# Patient Record
Sex: FEMALE | Race: BLACK OR AFRICAN AMERICAN | NOT HISPANIC OR LATINO
[De-identification: names, ages, dates, MRNs, and addresses within clinical notes are randomized per-mention and may not be internally consistent; named-entity substitution may affect disease eponyms.]

---

## 2017-10-11 ENCOUNTER — APPOINTMENT (OUTPATIENT)
Dept: CARDIOLOGY | Facility: CLINIC | Age: 67
End: 2017-10-11
Payer: MEDICARE

## 2017-10-11 PROCEDURE — 99205 OFFICE O/P NEW HI 60 MIN: CPT

## 2017-10-11 PROCEDURE — 93000 ELECTROCARDIOGRAM COMPLETE: CPT

## 2017-10-30 ENCOUNTER — APPOINTMENT (OUTPATIENT)
Dept: CARDIOLOGY | Facility: CLINIC | Age: 67
End: 2017-10-30
Payer: MEDICARE

## 2017-10-30 PROCEDURE — 93979 VASCULAR STUDY: CPT

## 2017-10-30 PROCEDURE — 93306 TTE W/DOPPLER COMPLETE: CPT

## 2017-10-30 PROCEDURE — 93880 EXTRACRANIAL BILAT STUDY: CPT

## 2017-11-01 ENCOUNTER — APPOINTMENT (OUTPATIENT)
Dept: CARDIOLOGY | Facility: CLINIC | Age: 67
End: 2017-11-01
Payer: MEDICARE

## 2017-11-01 PROCEDURE — 93015 CV STRESS TEST SUPVJ I&R: CPT

## 2017-11-01 PROCEDURE — A9502: CPT

## 2017-11-01 PROCEDURE — 78452 HT MUSCLE IMAGE SPECT MULT: CPT

## 2017-11-02 ENCOUNTER — APPOINTMENT (OUTPATIENT)
Dept: CARDIOLOGY | Facility: CLINIC | Age: 67
End: 2017-11-02
Payer: MEDICARE

## 2017-11-02 PROCEDURE — 99214 OFFICE O/P EST MOD 30 MIN: CPT

## 2017-11-17 ENCOUNTER — RECORD ABSTRACTING (OUTPATIENT)
Age: 67
End: 2017-11-17

## 2017-11-17 DIAGNOSIS — Z87.898 PERSONAL HISTORY OF OTHER SPECIFIED CONDITIONS: ICD-10-CM

## 2017-11-17 DIAGNOSIS — F17.200 NICOTINE DEPENDENCE, UNSPECIFIED, UNCOMPLICATED: ICD-10-CM

## 2017-11-17 DIAGNOSIS — Z82.5 FAMILY HISTORY OF ASTHMA AND OTHER CHRONIC LOWER RESPIRATORY DISEASES: ICD-10-CM

## 2017-11-17 DIAGNOSIS — Z82.49 FAMILY HISTORY OF ISCHEMIC HEART DISEASE AND OTHER DISEASES OF THE CIRCULATORY SYSTEM: ICD-10-CM

## 2017-11-17 DIAGNOSIS — Z78.9 OTHER SPECIFIED HEALTH STATUS: ICD-10-CM

## 2017-11-17 RX ORDER — ASPIRIN ENTERIC COATED TABLETS 81 MG 81 MG/1
81 TABLET, DELAYED RELEASE ORAL
Refills: 0 | Status: ACTIVE | COMMUNITY

## 2017-11-22 ENCOUNTER — APPOINTMENT (OUTPATIENT)
Dept: CARDIOLOGY | Facility: CLINIC | Age: 67
End: 2017-11-22
Payer: MEDICARE

## 2017-11-22 VITALS
HEART RATE: 86 BPM | HEIGHT: 67 IN | BODY MASS INDEX: 29.98 KG/M2 | RESPIRATION RATE: 16 BRPM | WEIGHT: 191 LBS | OXYGEN SATURATION: 98 % | DIASTOLIC BLOOD PRESSURE: 68 MMHG | SYSTOLIC BLOOD PRESSURE: 128 MMHG

## 2017-11-22 PROCEDURE — 99214 OFFICE O/P EST MOD 30 MIN: CPT

## 2017-11-22 RX ORDER — CLOPIDOGREL 75 MG/1
75 TABLET, FILM COATED ORAL DAILY
Refills: 0 | Status: DISCONTINUED | COMMUNITY
End: 2017-11-22

## 2018-05-23 ENCOUNTER — APPOINTMENT (OUTPATIENT)
Dept: CARDIOLOGY | Facility: CLINIC | Age: 68
End: 2018-05-23
Payer: MEDICARE

## 2018-06-28 ENCOUNTER — APPOINTMENT (OUTPATIENT)
Dept: CARDIOLOGY | Facility: CLINIC | Age: 68
End: 2018-06-28
Payer: MEDICARE

## 2018-06-28 VITALS
DIASTOLIC BLOOD PRESSURE: 70 MMHG | WEIGHT: 200 LBS | BODY MASS INDEX: 31.39 KG/M2 | HEART RATE: 75 BPM | HEIGHT: 67 IN | SYSTOLIC BLOOD PRESSURE: 142 MMHG

## 2018-06-28 PROCEDURE — 99214 OFFICE O/P EST MOD 30 MIN: CPT

## 2019-01-16 ENCOUNTER — APPOINTMENT (OUTPATIENT)
Dept: CARDIOLOGY | Facility: CLINIC | Age: 69
End: 2019-01-16
Payer: MEDICARE

## 2019-01-16 VITALS
SYSTOLIC BLOOD PRESSURE: 128 MMHG | OXYGEN SATURATION: 97 % | HEART RATE: 75 BPM | WEIGHT: 200 LBS | DIASTOLIC BLOOD PRESSURE: 62 MMHG | HEIGHT: 67 IN | BODY MASS INDEX: 31.39 KG/M2

## 2019-01-16 PROCEDURE — 99214 OFFICE O/P EST MOD 30 MIN: CPT

## 2019-01-16 NOTE — REASON FOR VISIT
[Follow-Up - Clinic] : a clinic follow-up of [FreeTextEntry2] : nonobstructive cath, abnormal MPI stress test, HTN, dyslipidemia [FreeTextEntry1] : Charlotte is a 68-year-old female with history of hypertension, dyslipidemia, moderate obesity, ocular stroke, sciatica, knee pain, limited ambulation, arthritis, depression, death of her son in 2016 and her  June 2018 with lung cancer. \par \par Cardiac catheterization November 2017, LVEF 65%, proximal LAD 70% with FFR 0.83 not significant small caliber first diagonal 80%.  Medical therapy.  Consider PCI of LAD if symptoms of angina. \par \par Cardiovascular review of systems is negative for exertional chest pain, dyspnea, palpitations, or syncope.  No PND, orthopnea, or leg edema.  No bleeding or black stool.  The patient has dizziness consistent with vertigo. \par \par Patient is not exercising on a routine basis.  The patient has back pain and knee pain which limits her ambulation.  Patient walking 10 minutes without exertional chest pain. \par \par 2-D echo October 2017 LVEF 60-65%, mild diastolic dysfunction, mild MR, normal PASP.\par  \par Carotid and abdominal ultrasound October 2017, 2.6cm\par \par Lexascan Myoview stress test, November 2017, LVEF 57%, mild midanterior ischemic defect, nonischemic EKG response, no anginal symptoms, baseline sinus rhythm NSST\par \par EKG, October 11, 2017, sinus rhythm with inferolateral ST-T wave abnormalities consistent with ischemia. \par \par

## 2019-01-16 NOTE — ASSESSMENT
[FreeTextEntry1] : Charlotte is a 68-year-old female  with medical history detailed above and active medical issues including:\par \par - No anginal symptoms, limited exercise capacity abnormal stress test November 2017,  Cardiac catheterization November 2017, LVEF 65%, proximal LAD 70% with FFR 0.83 not significant small caliber first diagonal 80%.  Interventional cardiologist Dr Hu. Medical therapy.  Consider PCI of LAD if symptoms of angina. \par \par -Hypertension.  Currently at goal less than 130/80, on hydrochlorothiazide. \par \par -Dyslipidemia.  On Crestor, well tolerated. \par \par -History of ocular stroke with left eye blindness in 2014. \par \par -Anxiety and depression after son passed away in 2016. \par \par -Chronic back pain, sciatica, and knee pain limited ambulation. \par \par -Tobacco addiction, smoking cessation discussed at length.  Patient states she quit smoking 8 days ago.\par \par  Patient will be seen seen in cardiology followup in 6 months. Patient will have 2-D echocardiogram to assess for  LV systolic function, wall motion and  structural heart disease. Carotid and abdominal ultrasound to assess for obstructive PAD. \par \par Current cardiac medications remain unchanged. Repeat labs will be ordered with PMD.\par \par Charlotte will follow up with Dr. Kael Garcia for primary care. \par \par

## 2019-04-22 ENCOUNTER — APPOINTMENT (OUTPATIENT)
Dept: CARDIOLOGY | Facility: CLINIC | Age: 69
End: 2019-04-22
Payer: MEDICARE

## 2019-04-22 PROCEDURE — 93306 TTE W/DOPPLER COMPLETE: CPT

## 2019-04-22 PROCEDURE — 93979 VASCULAR STUDY: CPT

## 2019-04-29 ENCOUNTER — APPOINTMENT (OUTPATIENT)
Dept: CARDIOLOGY | Facility: CLINIC | Age: 69
End: 2019-04-29
Payer: MEDICARE

## 2019-04-29 PROCEDURE — 93880 EXTRACRANIAL BILAT STUDY: CPT

## 2019-05-01 ENCOUNTER — APPOINTMENT (OUTPATIENT)
Dept: CARDIOLOGY | Facility: CLINIC | Age: 69
End: 2019-05-01
Payer: MEDICARE

## 2019-05-01 VITALS
WEIGHT: 196 LBS | SYSTOLIC BLOOD PRESSURE: 130 MMHG | HEART RATE: 77 BPM | HEIGHT: 67 IN | DIASTOLIC BLOOD PRESSURE: 76 MMHG | OXYGEN SATURATION: 95 % | BODY MASS INDEX: 30.76 KG/M2

## 2019-05-01 PROCEDURE — 99214 OFFICE O/P EST MOD 30 MIN: CPT

## 2019-05-01 RX ORDER — UBIDECARENONE/VIT E ACET 100MG-5
25 MCG CAPSULE ORAL
Refills: 0 | Status: DISCONTINUED | COMMUNITY
End: 2019-05-01

## 2019-05-01 NOTE — ASSESSMENT
[FreeTextEntry1] : Charlotte is a 69-year-old female  with medical history detailed above and active medical issues including:\par \par - No anginal symptoms, limited exercise capacity abnormal stress test November 2017,  Cardiac catheterization November 2017, LVEF 65%, proximal LAD 70% with FFR 0.83 not significant small caliber first diagonal 80%.  Interventional cardiologist Dr Hu. Medical therapy.  Consider PCI of LAD if symptoms of angina. Normal echo LVEF 60% April 2019.\par \par -Hypertension.  Currently at goal less than 130/80, on hydrochlorothiazide. \par \par -Dyslipidemia.  On Crestor, well tolerated. \par \par -History of ocular stroke with left eye blindness in 2014. \par \par -Anxiety and depression after son passed away in 2016. \par \par -Chronic back pain, sciatica, and knee pain limited ambulation. \par \par -Tobacco addiction, smoking cessation discussed at length.  Patient states she quit smoking 8 days ago.\par \par Patient will be seen seen in cardiology followup in 6 months. Current cardiac medications remain unchanged. Repeat labs will be ordered with PMD.\par \par Charlotte will follow up with Dr. Kael Garcia for primary care. \par \par

## 2019-05-01 NOTE — PHYSICAL EXAM
[Normal Appearance] : normal appearance [Well Groomed] : well groomed [General Appearance - Well Developed] : well developed [General Appearance - In No Acute Distress] : no acute distress [No Deformities] : no deformities [General Appearance - Well Nourished] : well nourished [Eyelids - No Xanthelasma] : the eyelids demonstrated no xanthelasmas [Normal Conjunctiva] : the conjunctiva exhibited no abnormalities [Normal Oral Mucosa] : normal oral mucosa [No Oral Pallor] : no oral pallor [No Oral Cyanosis] : no oral cyanosis [Normal Jugular Venous A Waves Present] : normal jugular venous A waves present [Normal Jugular Venous V Waves Present] : normal jugular venous V waves present [No Jugular Venous Abdul A Waves] : no jugular venous abdul A waves [Exaggerated Use Of Accessory Muscles For Inspiration] : no accessory muscle use [Respiration, Rhythm And Depth] : normal respiratory rhythm and effort [Auscultation Breath Sounds / Voice Sounds] : lungs were clear to auscultation bilaterally [Heart Sounds] : normal S1 and S2 [Heart Rate And Rhythm] : heart rate and rhythm were normal [Abdomen Soft] : soft [Abdomen Tenderness] : non-tender [Murmurs] : no murmurs present [Abnormal Walk] : normal gait [Abdomen Mass (___ Cm)] : no abdominal mass palpated [Gait - Sufficient For Exercise Testing] : the gait was sufficient for exercise testing [Nail Clubbing] : no clubbing of the fingernails [Petechial Hemorrhages (___cm)] : no petechial hemorrhages [Cyanosis, Localized] : no localized cyanosis [No Venous Stasis] : no venous stasis [Skin Color & Pigmentation] : normal skin color and pigmentation [] : no rash [No Skin Ulcers] : no skin ulcer [Skin Lesions] : no skin lesions [Oriented To Time, Place, And Person] : oriented to person, place, and time [No Xanthoma] : no  xanthoma was observed [Affect] : the affect was normal [Mood] : the mood was normal [No Anxiety] : not feeling anxious

## 2019-05-01 NOTE — REASON FOR VISIT
[Follow-Up - Clinic] : a clinic follow-up of [FreeTextEntry2] : nonobstructive cath Nov 2017, abnormal MPI stress test, HTN, dyslipidemia [FreeTextEntry1] : Charlotte is a 69-year-old female with history of hypertension, dyslipidemia, moderate obesity, ocular stroke, sciatica, knee pain, limited ambulation, arthritis, depression, death of her son in 2016 and her  June 2018 with lung cancer. \par \par Cardiac catheterization November 2017, LVEF 65%, proximal LAD 70% with FFR 0.83 not significant small caliber first diagonal 80%.  Medical therapy.  Consider PCI of LAD if symptoms of angina. \par \par Cardiovascular review of systems is negative for exertional chest pain, dyspnea, palpitations, or syncope.  No PND, orthopnea, or leg edema.  No bleeding or black stool.  The patient has dizziness consistent with vertigo. \par \par Patient is not exercising on a routine basis.  The patient has back pain and knee pain which limits her ambulation.  Patient walking 10 minutes without exertional chest pain. \par \par Echocardiogram April 2019, LVEF 60%, mild diastolic dysfunction, trace MR TR, normal RVSP\par \par Carotid and abdominal ultrasound April 2019, mild nonobstructive plaque, normal abdominal aortic size\par \par 2-D echo October 2017 LVEF 60-65%, mild diastolic dysfunction, mild MR, normal PASP.\par  \par Carotid and abdominal ultrasound October 2017, 2.6cm\par \par Lexascan Myoview stress test, November 2017, LVEF 57%, mild midanterior ischemic defect, nonischemic EKG response, no anginal symptoms, baseline sinus rhythm NSST\par \par EKG, October 11, 2017, sinus rhythm with inferolateral ST-T wave abnormalities consistent with ischemia. \par \par

## 2019-11-07 ENCOUNTER — APPOINTMENT (OUTPATIENT)
Dept: CARDIOLOGY | Facility: CLINIC | Age: 69
End: 2019-11-07
Payer: MEDICARE

## 2019-11-07 VITALS
HEIGHT: 66.5 IN | WEIGHT: 192 LBS | OXYGEN SATURATION: 94 % | BODY MASS INDEX: 30.49 KG/M2 | SYSTOLIC BLOOD PRESSURE: 132 MMHG | DIASTOLIC BLOOD PRESSURE: 78 MMHG | HEART RATE: 83 BPM

## 2019-11-07 PROCEDURE — 99214 OFFICE O/P EST MOD 30 MIN: CPT

## 2019-11-07 NOTE — PHYSICAL EXAM
[General Appearance - Well Developed] : well developed [Normal Appearance] : normal appearance [Well Groomed] : well groomed [General Appearance - Well Nourished] : well nourished [No Deformities] : no deformities [General Appearance - In No Acute Distress] : no acute distress [Normal Conjunctiva] : the conjunctiva exhibited no abnormalities [Eyelids - No Xanthelasma] : the eyelids demonstrated no xanthelasmas [Normal Oral Mucosa] : normal oral mucosa [No Oral Pallor] : no oral pallor [No Oral Cyanosis] : no oral cyanosis [Normal Jugular Venous A Waves Present] : normal jugular venous A waves present [Normal Jugular Venous V Waves Present] : normal jugular venous V waves present [No Jugular Venous Abdul A Waves] : no jugular venous abdul A waves [Respiration, Rhythm And Depth] : normal respiratory rhythm and effort [Exaggerated Use Of Accessory Muscles For Inspiration] : no accessory muscle use [Auscultation Breath Sounds / Voice Sounds] : lungs were clear to auscultation bilaterally [Heart Rate And Rhythm] : heart rate and rhythm were normal [Heart Sounds] : normal S1 and S2 [Murmurs] : no murmurs present [Abdomen Soft] : soft [Abdomen Tenderness] : non-tender [Abdomen Mass (___ Cm)] : no abdominal mass palpated [Abnormal Walk] : normal gait [Gait - Sufficient For Exercise Testing] : the gait was sufficient for exercise testing [Cyanosis, Localized] : no localized cyanosis [Nail Clubbing] : no clubbing of the fingernails [Petechial Hemorrhages (___cm)] : no petechial hemorrhages [Skin Color & Pigmentation] : normal skin color and pigmentation [] : no rash [Skin Lesions] : no skin lesions [No Venous Stasis] : no venous stasis [No Skin Ulcers] : no skin ulcer [No Xanthoma] : no  xanthoma was observed [Affect] : the affect was normal [Oriented To Time, Place, And Person] : oriented to person, place, and time [Mood] : the mood was normal [No Anxiety] : not feeling anxious

## 2019-11-07 NOTE — ASSESSMENT
[FreeTextEntry1] : Charlotte is a 69-year-old female  with medical history detailed above and active medical issues including:\par \par - Patient has dyspnea with moderate exertion with limited exercise capacity abnormal stress test November 2017,  Cardiac catheterization November 2017, LVEF 65%, proximal LAD 70% with FFR 0.83 not significant small caliber first diagonal 80%.  Interventional cardiologist Dr Hu. Medical therapy.  Consider PCI of LAD if symptoms of angina. Normal echo LVEF 60% April 2019. Patient will have noninvasive testing with a Lexascan Myoview stress test to assess for obstructive CAD. \par \par -Hypertension.  Currently at goal less than 130/80, on hydrochlorothiazide. \par \par -Dyslipidemia.  On Crestor, well tolerated. \par \par -History of ocular stroke with left eye blindness in 2014. \par \par -Anxiety and depression after son passed away in 2016. \par \par -Chronic back pain, sciatica, and knee pain limited ambulation. \par \par -Tobacco addiction, smoking cessation discussed at length.  Patient states she quit smoking 8 days ago.\par \par Patient will be seen in cardiology follow-up after noninvasive testing. Current cardiac medications remain unchanged. Repeat labs will be ordered with PMD.\par \par Charlotte will follow up with Dr. Kael Garcia for primary care. \par \par

## 2019-11-07 NOTE — REASON FOR VISIT
[Follow-Up - Clinic] : a clinic follow-up of [FreeTextEntry2] : moderate CAD cath Nov 2017 LAD 70% with nonobstructive FFR, abnormal MPI stress test, HTN, dyslipidemia [FreeTextEntry1] : Charlotte is a 69-year-old female with history of hypertension, dyslipidemia, moderate obesity, ocular stroke, sciatica, knee pain, limited ambulation, arthritis, depression, death of her son in 2016 and her  June 2018 with lung cancer. \par \par Cardiac catheterization November 2017, LVEF 65%, proximal LAD 70% with FFR 0.83 not significant small caliber first diagonal 80%.  Medical therapy.  Consider PCI of LAD if symptoms of angina. \par \par Patient has dyspnea with moderate exertion. Cardiovascular review of systems is negative for exertional chest pain, palpitations, or syncope.  No PND, orthopnea, or leg edema.  No bleeding or black stool.  The patient has dizziness consistent with vertigo. \par \par Patient is not exercising on a routine basis.  The patient has back pain and knee pain which limits her ambulation.  Patient walking less than 5 minutes and is not able to complete treadmill stress test. Patient will be scheduled for pharmacologic stress test.\par \par Echocardiogram April 2019, LVEF 60%, mild diastolic dysfunction, trace MR TR, normal RVSP\par \par Carotid and abdominal ultrasound April 2019, mild nonobstructive plaque, normal abdominal aortic size\par \par 2-D echo October 2017 LVEF 60-65%, mild diastolic dysfunction, mild MR, normal PASP.\par  \par Carotid and abdominal ultrasound October 2017, 2.6cm\par \par Lexascan Myoview stress test, November 2017, LVEF 57%, mild midanterior ischemic defect, nonischemic EKG response, no anginal symptoms, baseline sinus rhythm NSST\par \par EKG, October 11, 2017, sinus rhythm with inferolateral ST-T wave abnormalities consistent with ischemia. \par \par

## 2019-11-19 ENCOUNTER — APPOINTMENT (OUTPATIENT)
Dept: CARDIOLOGY | Facility: CLINIC | Age: 69
End: 2019-11-19
Payer: MEDICARE

## 2019-11-19 PROCEDURE — A9502: CPT

## 2019-11-19 PROCEDURE — 78452 HT MUSCLE IMAGE SPECT MULT: CPT

## 2019-11-19 PROCEDURE — 93015 CV STRESS TEST SUPVJ I&R: CPT

## 2019-12-12 ENCOUNTER — APPOINTMENT (OUTPATIENT)
Dept: CARDIOLOGY | Facility: CLINIC | Age: 69
End: 2019-12-12
Payer: MEDICARE

## 2019-12-12 VITALS
OXYGEN SATURATION: 93 % | DIASTOLIC BLOOD PRESSURE: 78 MMHG | HEIGHT: 66 IN | BODY MASS INDEX: 30.86 KG/M2 | HEART RATE: 80 BPM | SYSTOLIC BLOOD PRESSURE: 132 MMHG | WEIGHT: 192 LBS

## 2019-12-12 PROCEDURE — 99214 OFFICE O/P EST MOD 30 MIN: CPT

## 2019-12-12 RX ORDER — ZINC SU/MERCURIC OX/BORIC ACID
1 OINTMENT (GRAM) OPHTHALMIC (EYE)
Refills: 0 | Status: DISCONTINUED | COMMUNITY
End: 2019-12-12

## 2019-12-12 NOTE — REASON FOR VISIT
[Follow-Up - Clinic] : a clinic follow-up of [FreeTextEntry2] : MILLER and fatigue, cath Nov 2107 prox LAD 70% with FFR 0.83, abnormal MPI stress test moderate anterior ischemia , HTN, dyslipidemia [FreeTextEntry1] : Charlotte is a 69-year-old female with history of hypertension, dyslipidemia, moderate obesity, ocular stroke, sciatica, knee pain, limited ambulation, arthritis, depression, death of her son in 2016 and her  June 2018 with lung cancer. \par \par Cardiac catheterization November 2017, LVEF 65%, proximal LAD 70% with FFR 0.83 ( significant stenosis is FFR <0.8),  small caliber first diagonal 80% with recommendation for medical therapy.  Consider PCI of LAD if symptoms of angina as per interventionalist Dr Bench RIVAS. \par \par Patient has dyspnea with moderate exertion and fatigue. Cardiovascular review of systems is negative for exertional chest pain, palpitations, or syncope.  No PND, orthopnea, or leg edema.  No bleeding or black stool.  The patient has dizziness consistent with vertigo. \par \par Patient is not exercising on a routine basis.  The patient has back pain and knee pain which limits her ambulation.  Patient walking less than 5 minutes. \par \par Lexascan Myoview stress test November 2019 LVEF 57%, moderate anterior ischemia seen on images directly reviewed by me, nondiagnostic EKG response with exaggeration of the baseline changes, no chest pain, baseline sinus rhythm NSST\par \par Echocardiogram April 2019, LVEF 60%, mild diastolic dysfunction, trace MR TR, normal RVSP\par \par Carotid and abdominal ultrasound April 2019, mild nonobstructive plaque, normal abdominal aortic size\par \par 2-D echo October 2017 LVEF 60-65%, mild diastolic dysfunction, mild MR, normal PASP.\par  \par Carotid and abdominal ultrasound October 2017, 2.6cm\par \par Lexascan Myoview stress test, November 2017, LVEF 57%, mild midanterior ischemic defect, nonischemic EKG response, no anginal symptoms, baseline sinus rhythm NSST\par \par EKG, October 11, 2017, sinus rhythm with inferolateral ST-T wave abnormalities consistent with ischemia. \par \par

## 2019-12-12 NOTE — PHYSICAL EXAM
[General Appearance - Well Developed] : well developed [Well Groomed] : well groomed [General Appearance - Well Nourished] : well nourished [Normal Appearance] : normal appearance [No Deformities] : no deformities [Normal Conjunctiva] : the conjunctiva exhibited no abnormalities [General Appearance - In No Acute Distress] : no acute distress [Eyelids - No Xanthelasma] : the eyelids demonstrated no xanthelasmas [Normal Oral Mucosa] : normal oral mucosa [No Oral Pallor] : no oral pallor [No Oral Cyanosis] : no oral cyanosis [Normal Jugular Venous A Waves Present] : normal jugular venous A waves present [Normal Jugular Venous V Waves Present] : normal jugular venous V waves present [No Jugular Venous Abdul A Waves] : no jugular venous abdul A waves [Respiration, Rhythm And Depth] : normal respiratory rhythm and effort [Exaggerated Use Of Accessory Muscles For Inspiration] : no accessory muscle use [Auscultation Breath Sounds / Voice Sounds] : lungs were clear to auscultation bilaterally [Heart Rate And Rhythm] : heart rate and rhythm were normal [Murmurs] : no murmurs present [Heart Sounds] : normal S1 and S2 [Abdomen Soft] : soft [Abdomen Tenderness] : non-tender [Abdomen Mass (___ Cm)] : no abdominal mass palpated [Abnormal Walk] : normal gait [Nail Clubbing] : no clubbing of the fingernails [Gait - Sufficient For Exercise Testing] : the gait was sufficient for exercise testing [Cyanosis, Localized] : no localized cyanosis [Petechial Hemorrhages (___cm)] : no petechial hemorrhages [Skin Color & Pigmentation] : normal skin color and pigmentation [No Venous Stasis] : no venous stasis [Skin Lesions] : no skin lesions [] : no rash [No Skin Ulcers] : no skin ulcer [No Xanthoma] : no  xanthoma was observed [Affect] : the affect was normal [Oriented To Time, Place, And Person] : oriented to person, place, and time [No Anxiety] : not feeling anxious [Mood] : the mood was normal

## 2019-12-12 NOTE — ASSESSMENT
[FreeTextEntry1] : Charlotte is a 69-year-old female  with medical history detailed above and active medical issues including:\par \par - Dyspnea on exertion, fatigue, anginal equivalent, moderate anterior ischemia Myoview stress test November 2019, proximal LAD 70% stenosis with FFR 0.83 (significant stenosis is FFR<0.8). Risks and options of cardiac catheterization have been discussed, including the risk of bleeding stroke heart attack.  Patient wishes to proceed and will be scheduled for catheterization within one week.  Aspirin and Plavix will be continued until catheterization.  Persistent chest pain patient will call 911 and go to the emergency room. \par \par -Hypertension.  Currently at goal less than 130/80, on hydrochlorothiazide. \par \par -Dyslipidemia.  On Crestor, well tolerated. \par \par -History of ocular stroke with left eye blindness in 2014. \par \par -Anxiety and depression after son passed away in 2016. \par \par -Chronic back pain, sciatica, and knee pain limited ambulation. \par \par -Tobacco addiction, smoking cessation discussed at length.  Patient states she quit smoking 8 days ago.\par \par Patient will be seen in cardiology follow-up after cardiac catherization. Plavix added to medial regimen. Repeat labs will be ordered.\par \par Charlotte will follow up with Dr. Kael Garcia for primary care. \par \par

## 2019-12-19 ENCOUNTER — OUTPATIENT (OUTPATIENT)
Dept: OUTPATIENT SERVICES | Facility: HOSPITAL | Age: 69
LOS: 1 days | End: 2019-12-19
Payer: MEDICARE

## 2019-12-19 PROCEDURE — 92978 ENDOLUMINL IVUS OCT C 1ST: CPT | Mod: 26,LD

## 2019-12-19 PROCEDURE — 92928 PRQ TCAT PLMT NTRAC ST 1 LES: CPT | Mod: LD

## 2019-12-19 PROCEDURE — 93458 L HRT ARTERY/VENTRICLE ANGIO: CPT | Mod: 26,XU

## 2019-12-19 PROCEDURE — 93571 IV DOP VEL&/PRESS C FLO 1ST: CPT | Mod: 26,52

## 2019-12-23 ENCOUNTER — APPOINTMENT (OUTPATIENT)
Dept: CARDIOLOGY | Facility: CLINIC | Age: 69
End: 2019-12-23
Payer: MEDICARE

## 2019-12-23 ENCOUNTER — NON-APPOINTMENT (OUTPATIENT)
Age: 69
End: 2019-12-23

## 2019-12-23 VITALS
DIASTOLIC BLOOD PRESSURE: 72 MMHG | WEIGHT: 192 LBS | OXYGEN SATURATION: 98 % | BODY MASS INDEX: 30.86 KG/M2 | HEART RATE: 67 BPM | HEIGHT: 66 IN | SYSTOLIC BLOOD PRESSURE: 138 MMHG

## 2019-12-23 PROCEDURE — 93000 ELECTROCARDIOGRAM COMPLETE: CPT

## 2019-12-23 PROCEDURE — 99214 OFFICE O/P EST MOD 30 MIN: CPT

## 2020-01-29 ENCOUNTER — APPOINTMENT (OUTPATIENT)
Dept: CARDIOLOGY | Facility: CLINIC | Age: 70
End: 2020-01-29
Payer: MEDICARE

## 2020-01-29 ENCOUNTER — NON-APPOINTMENT (OUTPATIENT)
Age: 70
End: 2020-01-29

## 2020-01-29 VITALS
SYSTOLIC BLOOD PRESSURE: 116 MMHG | HEART RATE: 70 BPM | DIASTOLIC BLOOD PRESSURE: 60 MMHG | OXYGEN SATURATION: 95 % | BODY MASS INDEX: 31.08 KG/M2 | WEIGHT: 198 LBS | HEIGHT: 67 IN

## 2020-01-29 PROCEDURE — 93000 ELECTROCARDIOGRAM COMPLETE: CPT

## 2020-01-29 PROCEDURE — 99214 OFFICE O/P EST MOD 30 MIN: CPT

## 2020-01-29 NOTE — PHYSICAL EXAM
[Well Groomed] : well groomed [Normal Appearance] : normal appearance [General Appearance - Well Developed] : well developed [General Appearance - Well Nourished] : well nourished [No Deformities] : no deformities [General Appearance - In No Acute Distress] : no acute distress [Normal Conjunctiva] : the conjunctiva exhibited no abnormalities [Eyelids - No Xanthelasma] : the eyelids demonstrated no xanthelasmas [Normal Oral Mucosa] : normal oral mucosa [No Oral Pallor] : no oral pallor [No Oral Cyanosis] : no oral cyanosis [Normal Jugular Venous A Waves Present] : normal jugular venous A waves present [Normal Jugular Venous V Waves Present] : normal jugular venous V waves present [No Jugular Venous Abdul A Waves] : no jugular venous abdul A waves [Respiration, Rhythm And Depth] : normal respiratory rhythm and effort [Exaggerated Use Of Accessory Muscles For Inspiration] : no accessory muscle use [Auscultation Breath Sounds / Voice Sounds] : lungs were clear to auscultation bilaterally [Heart Rate And Rhythm] : heart rate and rhythm were normal [Murmurs] : no murmurs present [Heart Sounds] : normal S1 and S2 [Abdomen Soft] : soft [Abdomen Tenderness] : non-tender [Abnormal Walk] : normal gait [Abdomen Mass (___ Cm)] : no abdominal mass palpated [Gait - Sufficient For Exercise Testing] : the gait was sufficient for exercise testing [Nail Clubbing] : no clubbing of the fingernails [Cyanosis, Localized] : no localized cyanosis [Petechial Hemorrhages (___cm)] : no petechial hemorrhages [] : no rash [Skin Color & Pigmentation] : normal skin color and pigmentation [No Venous Stasis] : no venous stasis [Skin Lesions] : no skin lesions [No Skin Ulcers] : no skin ulcer [No Xanthoma] : no  xanthoma was observed [Oriented To Time, Place, And Person] : oriented to person, place, and time [Mood] : the mood was normal [Affect] : the affect was normal [No Anxiety] : not feeling anxious

## 2020-01-29 NOTE — REASON FOR VISIT
[Follow-Up - Clinic] : a clinic follow-up of [FreeTextEntry2] : CAROL pLAD 12/19/19, abnormal MPI stress test moderate anterior ischemia , HTN, dyslipidemia [FreeTextEntry1] : Charlotte is a 70-year-old female with history of hypertension, dyslipidemia, moderate obesity, ocular stroke, sciatica, knee pain, limited ambulation, arthritis, depression, death of her son in 2016 and her  June 2018 with lung cancer. \par \par Cardiac catheterization 12/19/19, LVEF 57%, CAROL proximal LAD D1 80% stenosis D2 90% stenosis\par \par Cardiac catheterization November 2017, LVEF 65%, proximal LAD 70% with FFR 0.83 ( significant stenosis is FFR <0.8),  small caliber first diagonal 80% with recommendation for medical therapy.  Consider PCI of LAD if symptoms of angina as per interventionalist Dr Bench RIVAS. \par \par Patient has dyspnea with moderate exertion and fatigue. Cardiovascular review of systems is negative for exertional chest pain, palpitations, or syncope.  No PND, orthopnea, or leg edema.  No bleeding or black stool.  The patient has dizziness consistent with vertigo. \par \par Patient is not exercising on a routine basis.  The patient has back pain and knee pain which limits her ambulation.  Patient walking less than 5 minutes. \par \par Lexascan Myoview stress test November 2019 LVEF 57%, moderate anterior ischemia seen on images directly reviewed by me, nondiagnostic EKG response with exaggeration of the baseline changes, no chest pain, baseline sinus rhythm NSST\par \par Echocardiogram April 2019, LVEF 60%, mild diastolic dysfunction, trace MR TR, normal RVSP\par \par Carotid and abdominal ultrasound April 2019, mild nonobstructive plaque, normal abdominal aortic size\par \par 2-D echo October 2017 LVEF 60-65%, mild diastolic dysfunction, mild MR, normal PASP.\par  \par Carotid and abdominal ultrasound October 2017, 2.6cm\par \par Lexascan Myoview stress test, November 2017, LVEF 57%, mild midanterior ischemic defect, nonischemic EKG response, no anginal symptoms, baseline sinus rhythm NSST\par \par EKG, October 11, 2017, sinus rhythm with inferolateral ST-T wave abnormalities consistent with ischemia. \par \par

## 2020-01-29 NOTE — ASSESSMENT
[FreeTextEntry1] : Charlotte is a 69-year-old female  with medical history detailed above and active medical issues including:\par \par - CAD, CAROL proximal LAD 12/19/19, stable on aspirin Plavix Toprol, Crestor\par \par - Hypertension.  Currently at goal less than 130/80, on hydrochlorothiazide. \par \par - Dyslipidemia on Crestor well tolerated. \par \par - History of ocular stroke with left eye blindness in 2014. \par \par - Anxiety and depression after son passed away in 2016. \par \par -Chronic back pain, sciatica, and knee pain limited ambulation. \par \par - Tobacco addiction, smoking cessation discussed at length.  Patient states she quit smoking 12/19/19\par \par Patient will be seen in cardiology follow-up 3 months.  Repeat labs will be ordered.\par \par Charlotte will follow up with Dr. Kael Garcia for primary care. \par \par

## 2020-03-10 ENCOUNTER — APPOINTMENT (OUTPATIENT)
Dept: CARDIOLOGY | Facility: CLINIC | Age: 70
End: 2020-03-10
Payer: MEDICARE

## 2020-03-10 VITALS
BODY MASS INDEX: 31.39 KG/M2 | DIASTOLIC BLOOD PRESSURE: 68 MMHG | OXYGEN SATURATION: 95 % | HEIGHT: 67 IN | HEART RATE: 75 BPM | SYSTOLIC BLOOD PRESSURE: 138 MMHG | WEIGHT: 200 LBS

## 2020-03-10 PROCEDURE — 99214 OFFICE O/P EST MOD 30 MIN: CPT

## 2020-03-10 RX ORDER — HYDROCHLOROTHIAZIDE 25 MG/1
25 TABLET ORAL DAILY
Refills: 0 | Status: DISCONTINUED | COMMUNITY
End: 2020-03-10

## 2020-03-10 NOTE — PHYSICAL EXAM
[General Appearance - Well Developed] : well developed [Normal Appearance] : normal appearance [Well Groomed] : well groomed [General Appearance - Well Nourished] : well nourished [No Deformities] : no deformities [General Appearance - In No Acute Distress] : no acute distress [Normal Conjunctiva] : the conjunctiva exhibited no abnormalities [Eyelids - No Xanthelasma] : the eyelids demonstrated no xanthelasmas [Normal Oral Mucosa] : normal oral mucosa [No Oral Pallor] : no oral pallor [No Oral Cyanosis] : no oral cyanosis [Normal Jugular Venous A Waves Present] : normal jugular venous A waves present [Normal Jugular Venous V Waves Present] : normal jugular venous V waves present [No Jugular Venous Abdul A Waves] : no jugular venous abdul A waves [Respiration, Rhythm And Depth] : normal respiratory rhythm and effort [Exaggerated Use Of Accessory Muscles For Inspiration] : no accessory muscle use [Auscultation Breath Sounds / Voice Sounds] : lungs were clear to auscultation bilaterally [Heart Rate And Rhythm] : heart rate and rhythm were normal [Heart Sounds] : normal S1 and S2 [Murmurs] : no murmurs present [Abdomen Soft] : soft [Abdomen Tenderness] : non-tender [Abdomen Mass (___ Cm)] : no abdominal mass palpated [Abnormal Walk] : normal gait [Gait - Sufficient For Exercise Testing] : the gait was sufficient for exercise testing [Nail Clubbing] : no clubbing of the fingernails [Cyanosis, Localized] : no localized cyanosis [Petechial Hemorrhages (___cm)] : no petechial hemorrhages [Skin Color & Pigmentation] : normal skin color and pigmentation [] : no rash [No Venous Stasis] : no venous stasis [Skin Lesions] : no skin lesions [No Skin Ulcers] : no skin ulcer [No Xanthoma] : no  xanthoma was observed [Oriented To Time, Place, And Person] : oriented to person, place, and time [Affect] : the affect was normal [Mood] : the mood was normal [No Anxiety] : not feeling anxious [FreeTextEntry1] : distended abd

## 2020-03-10 NOTE — ASSESSMENT
[FreeTextEntry1] : SARITHA MCFADDEN  is a 70 year F  who presents today Mar 10, 2020 with the above history and the following active issues. \par \par - CAD, CAROL proximal LAD 12/19/19, stable on aspirin Plavix Toprol, Crestor\par \par - Hypertension.  Blood pressure well controlled on my assessment. Low sodium diet. \par \par - Recent weight gain/lower ext edema/abdominal distention - Recommend holding HCTZ and trial of Lasix 20mg with Potassium 20meq. Monitor daily weight. Low sodium diet. Continue to follow at cardiac rehab. Follow-up echocardiogram. Follow-up blood work including CMP and BNP.\par \par - Dyslipidemia on Crestor well tolerated. \par \par - History of ocular stroke with left eye blindness in 2014. \par \par - Anxiety and depression after son passed away in 2016. \par \par -Chronic back pain, sciatica, and knee pain limited ambulation. \par \par - Tobacco addiction, smoking cessation discussed at length.  Patient states she quit smoking 12/19/19\par \par \par Red flag symptoms which would warrant sooner emergent evaluation reviewed with the patient. \par Questions and concerns were addressed and answered. \par \par Clinical follow-up after testing unless symptoms warrant sooner emergent evaluation.\par \par Sincerely,\par \par Denise Barr PA-C\par Patients history, testing and plan reviewed with supervising MD: Dr. Mark Stern

## 2020-03-10 NOTE — ADDENDUM
[FreeTextEntry1] : Please note the patient was reviewed with CHARLIE Barr.\par I was physically present during the service of the patient\par I was directly involved in the management plan and recommendations of care provided to the patient. \par I personally reviewed the history and physical exam and examination as documented by the PA above.\par 03/10/2020\par

## 2020-03-10 NOTE — REASON FOR VISIT
[Follow-Up - Clinic] : a clinic follow-up of [FreeTextEntry2] : CAROL pLAD 12/19/19, abnormal MPI stress test moderate anterior ischemia , HTN, dyslipidemia [FreeTextEntry1] : Charlotte is a 70-year-old female with history of hypertension, dyslipidemia, moderate obesity, ocular stroke, sciatica, knee pain, limited ambulation, arthritis, depression, death of her son in 2016 and her  June 2018 with lung cancer. \par Referred for evaluation by cardiac rehab for weight gain. Patient feels she is retaining water in her abdomen and occasional lower extremity swelling. Following a low sodium diet. Monitoring her weight 3 times a week at cardiac rehab. Denies PND or orthopnea. \par \par Today she denies chest pain, pressure, unusual shortness of breath, lightheadedness, dizziness, near syncope or syncope. \par \par Cardiac catheterization 12/19/19, LVEF 57%, CAROL proximal LAD D1 80% stenosis D2 90% stenosis\par \par Cardiac catheterization November 2017, LVEF 65%, proximal LAD 70% with FFR 0.83 ( significant stenosis is FFR <0.8),  small caliber first diagonal 80% with recommendation for medical therapy.  Consider PCI of LAD if symptoms of angina as per interventionalist Dr Bench BROWN. \par \par Lexiscan Myoview stress test November 2019 LVEF 57%, moderate anterior ischemia seen on images directly reviewed by me, nondiagnostic EKG response with exaggeration of the baseline changes, no chest pain, baseline sinus rhythm NSST\par \par Echocardiogram April 2019, LVEF 60%, mild diastolic dysfunction, trace MR TR, normal RVSP\par \par Carotid and abdominal ultrasound April 2019, mild nonobstructive plaque, normal abdominal aortic size\par \par 2-D echo October 2017 LVEF 60-65%, mild diastolic dysfunction, mild MR, normal PASP.\par  \par Carotid and abdominal ultrasound October 2017, 2.6cm\par \par Lexascan Myoview stress test, November 2017, LVEF 57%, mild midanterior ischemic defect, nonischemic EKG response, no anginal symptoms, baseline sinus rhythm NSST\par \par EKG, October 11, 2017, sinus rhythm with inferolateral ST-T wave abnormalities consistent with ischemia. \par \par

## 2020-04-01 RX ORDER — POTASSIUM CHLORIDE 750 MG/1
10 TABLET, EXTENDED RELEASE ORAL DAILY
Refills: 0 | Status: DISCONTINUED | COMMUNITY
End: 2020-04-01

## 2020-06-12 ENCOUNTER — APPOINTMENT (OUTPATIENT)
Dept: CARDIOLOGY | Facility: CLINIC | Age: 70
End: 2020-06-12
Payer: MEDICARE

## 2020-06-12 PROCEDURE — 93880 EXTRACRANIAL BILAT STUDY: CPT

## 2020-06-12 PROCEDURE — 93306 TTE W/DOPPLER COMPLETE: CPT

## 2020-06-12 PROCEDURE — 93979 VASCULAR STUDY: CPT

## 2020-06-17 ENCOUNTER — APPOINTMENT (OUTPATIENT)
Dept: CARDIOLOGY | Facility: CLINIC | Age: 70
End: 2020-06-17
Payer: MEDICARE

## 2020-06-17 VITALS
WEIGHT: 208 LBS | BODY MASS INDEX: 32.65 KG/M2 | SYSTOLIC BLOOD PRESSURE: 132 MMHG | OXYGEN SATURATION: 97 % | DIASTOLIC BLOOD PRESSURE: 70 MMHG | HEIGHT: 67 IN | HEART RATE: 67 BPM | TEMPERATURE: 98.4 F

## 2020-06-17 PROCEDURE — 99214 OFFICE O/P EST MOD 30 MIN: CPT

## 2020-06-17 NOTE — PHYSICAL EXAM
[General Appearance - Well Developed] : well developed [Normal Appearance] : normal appearance [Well Groomed] : well groomed [General Appearance - Well Nourished] : well nourished [No Deformities] : no deformities [General Appearance - In No Acute Distress] : no acute distress [Normal Conjunctiva] : the conjunctiva exhibited no abnormalities [Eyelids - No Xanthelasma] : the eyelids demonstrated no xanthelasmas [Normal Oral Mucosa] : normal oral mucosa [No Oral Cyanosis] : no oral cyanosis [No Oral Pallor] : no oral pallor [Normal Jugular Venous A Waves Present] : normal jugular venous A waves present [Normal Jugular Venous V Waves Present] : normal jugular venous V waves present [No Jugular Venous Abdul A Waves] : no jugular venous abdul A waves [Respiration, Rhythm And Depth] : normal respiratory rhythm and effort [Exaggerated Use Of Accessory Muscles For Inspiration] : no accessory muscle use [Heart Rate And Rhythm] : heart rate and rhythm were normal [Auscultation Breath Sounds / Voice Sounds] : lungs were clear to auscultation bilaterally [Heart Sounds] : normal S1 and S2 [Murmurs] : no murmurs present [Abdomen Soft] : soft [Abdomen Tenderness] : non-tender [Abdomen Mass (___ Cm)] : no abdominal mass palpated [Abnormal Walk] : normal gait [Gait - Sufficient For Exercise Testing] : the gait was sufficient for exercise testing [Nail Clubbing] : no clubbing of the fingernails [Cyanosis, Localized] : no localized cyanosis [Petechial Hemorrhages (___cm)] : no petechial hemorrhages [] : no rash [No Venous Stasis] : no venous stasis [Skin Lesions] : no skin lesions [Skin Color & Pigmentation] : normal skin color and pigmentation [No Xanthoma] : no  xanthoma was observed [No Skin Ulcers] : no skin ulcer [Mood] : the mood was normal [Oriented To Time, Place, And Person] : oriented to person, place, and time [No Anxiety] : not feeling anxious [Affect] : the affect was normal

## 2020-06-17 NOTE — ASSESSMENT
[FreeTextEntry1] : Charlotte is a 70-year-old female  with medical history detailed above and active medical issues including:\par \par - CAD, abnormal MPI stress test moderate anterior ischemia, CAROL LAD 1219/19 on plavix, asa, crestor, toprol \par \par -Hypertension at goal less than 130/80\par \par -Dyslipidemia on Crestor well tolerated. \par \par -History of ocular stroke with left eye blindness in 2014. \par \par -Anxiety and depression after son passed away in 2016. \par \par -Chronic back pain, sciatica, and knee pain limited ambulation. \par \par -Tobacco addiction, quit smoking smoking Dec 2019. Smoking cessation discussed at length. \par \par Patient will be seen in cardiology follow-up after cardiac catherization. Plavix added to medial regimen. Repeat labs will be ordered.\par \par Charlotte will follow up with Dr. Kael Garcia for primary care. \par \par

## 2020-06-17 NOTE — REASON FOR VISIT
[Follow-Up - Clinic] : a clinic follow-up of [FreeTextEntry2] : MILLER and fatigue, abnormal MPI stress test moderate anterior ischemia, CAROL LAD 1219/19 [FreeTextEntry1] : Charlotte is a 69-year-old female with history of hypertension, dyslipidemia, moderate obesity, ocular stroke, sciatica, knee pain, limited ambulation, arthritis, depression, death of her son in 2016 and her  June 2018 with lung cancer, abnormal MPI stress test moderate anterior ischemia, CAROL LAD 1219/19. \par \par Cardiovascular review of systems is negative for exertional chest pain, dyspnea,  palpitations, or syncope.  No PND, orthopnea, or leg edema.  No bleeding or black stool.  The patient has dizziness consistent with vertigo. \par \par Patient is participating in cardiac rehab 3 x week. Patient will continue rehab, forms completed today.    \par \par Lexascan Myoview stress test November 2019 LVEF 57%, moderate anterior ischemia seen on images directly reviewed by me, nondiagnostic EKG response with exaggeration of the baseline changes, no chest pain, baseline sinus rhythm NSST\par \par Echocardiogram April 2019, LVEF 60%, mild diastolic dysfunction, trace MR TR, normal RVSP\par \par Carotid and abdominal ultrasound April 2019, mild nonobstructive plaque, normal abdominal aortic size\par \par 2-D echo October 2017 LVEF 60-65%, mild diastolic dysfunction, mild MR, normal PASP.\par  \par Carotid and abdominal ultrasound October 2017, 2.6cm\par \par Lexascan Myoview stress test, November 2017, LVEF 57%, mild midanterior ischemic defect, nonischemic EKG response, no anginal symptoms, baseline sinus rhythm NSST\par \par EKG, October 11, 2017, sinus rhythm with inferolateral ST-T wave abnormalities consistent with ischemia. \par \par

## 2020-08-13 ENCOUNTER — OUTPATIENT (OUTPATIENT)
Dept: OUTPATIENT SERVICES | Facility: HOSPITAL | Age: 70
LOS: 1 days | End: 2020-08-13
Payer: MEDICARE

## 2020-08-13 ENCOUNTER — APPOINTMENT (OUTPATIENT)
Dept: CARDIOLOGY | Facility: CLINIC | Age: 70
End: 2020-08-13
Payer: MEDICARE

## 2020-08-13 ENCOUNTER — NON-APPOINTMENT (OUTPATIENT)
Age: 70
End: 2020-08-13

## 2020-08-13 VITALS
OXYGEN SATURATION: 97 % | WEIGHT: 202 LBS | DIASTOLIC BLOOD PRESSURE: 70 MMHG | BODY MASS INDEX: 31.71 KG/M2 | HEART RATE: 79 BPM | HEIGHT: 67 IN | SYSTOLIC BLOOD PRESSURE: 120 MMHG

## 2020-08-13 PROCEDURE — 93000 ELECTROCARDIOGRAM COMPLETE: CPT

## 2020-08-13 PROCEDURE — 99214 OFFICE O/P EST MOD 30 MIN: CPT

## 2020-08-13 PROCEDURE — 93010 ELECTROCARDIOGRAM REPORT: CPT | Mod: 59

## 2020-08-13 PROCEDURE — 93458 L HRT ARTERY/VENTRICLE ANGIO: CPT | Mod: 26

## 2020-08-13 PROCEDURE — 99223 1ST HOSP IP/OBS HIGH 75: CPT | Mod: 25

## 2020-08-13 PROCEDURE — 99285 EMERGENCY DEPT VISIT HI MDM: CPT | Mod: CS

## 2020-08-13 NOTE — ASSESSMENT
[FreeTextEntry1] : Charlotte is a 70-year-old female  with medical history detailed above and active medical issues including:\par \par Active chest pressure: Recommend ER evaluation. Ambulance en route. IV placed by RN.  mg PO x 1 given. Patient verbalized an understanding and agree with plan.\par \par - CAD, abnormal MPI stress test moderate anterior ischemia, CAROL LAD 1219/19 on plavix, asa, crestor, toprol \par \par -Hypertension at goal less than 130/80\par \par -Dyslipidemia on Crestor well tolerated. \par \par -History of ocular stroke with left eye blindness in 2014. \par \par -Anxiety and depression after son passed away in 2016. \par \par -Chronic back pain, sciatica, and knee pain limited ambulation. \par \par -Tobacco addiction, quit smoking smoking Dec 2019. Smoking cessation discussed at length. \par \par \par \par Charlotte will follow up with Dr. Kael Garcia for primary care. \par \par F/U after hospitalization.\par Discussed red flag symptoms, which would warrant sooner or emergent medical evaluation.\par Any questions and concerns were addressed and resolved.\par \par Sincerely,\par Nanda Daly [facility]\par Patient's history, testing, and plan was reviewed with supervising physician, Dr. Mehreen Zurita\par

## 2020-08-13 NOTE — REASON FOR VISIT
[Follow-Up - Clinic] : a clinic follow-up of [FreeTextEntry2] : MILLER and fatigue, abnormal MPI stress test moderate anterior ischemia, CAROL LAD 1219/19 [FreeTextEntry1] : Charlotte is a 70-year-old female with history of hypertension, dyslipidemia, moderate obesity, ocular stroke, sciatica, knee pain, limited ambulation, arthritis, depression, death of her son in 2016 and her  June 2018 with lung cancer, abnormal MPI stress test moderate anterior ischemia, CAROL LAD 1219/19. \par \par Last seen 6/17/20. Presents today, 8/13/20 with active chest pressure 3/10. Worse with exertion. Nonradiating. Also endorses sx's of dizziness and lightheadedness. Sx's of chest pressure and dizziness/lightheadedness started ~ a "couple" of weeks ago and initially were intermittent. Denies SOB, palpitations, syncope, PND, orthopnea, claudication, and edema.\par \par BP stable on my exam, 132/70. HR 80. O2 sat 97%.\par \par EKG 8/13/20 demonstrates SR at 80 bpm, VA interval 174 ms, QTc 443 ms, nonspecific ST-T wave abnormalities \par \par Echo 6/12/20: EF 60%. Mild MR. Mildly dilated LA. no segmental wall motion abnormalities. Mild DD. Mild TR. Mild VA. Lipomatous hypertrophy of the interatrial septum.\par Abd u/s 6/12/20: No abd aorta aneurysm seen. mild plaque throughout aorta.\par \par Carotd u/s 6/12/20: Miln nonobstructive carotid dz noted BL.\par \par Lexascan Myoview stress test November 2019 LVEF 57%, moderate anterior ischemia seen on images directly reviewed by me, nondiagnostic EKG response with exaggeration of the baseline changes, no chest pain, baseline sinus rhythm NSST\par \par Echocardiogram April 2019, LVEF 60%, mild diastolic dysfunction, trace MR TR, normal RVSP\par \par Carotid and abdominal ultrasound April 2019, mild nonobstructive plaque, normal abdominal aortic size\par \par 2-D echo October 2017 LVEF 60-65%, mild diastolic dysfunction, mild MR, normal PASP.\par  \par Carotid and abdominal ultrasound October 2017, 2.6cm\par \par Lexascan Myoview stress test, November 2017, LVEF 57%, mild midanterior ischemic defect, nonischemic EKG response, no anginal symptoms, baseline sinus rhythm NSST\par \par EKG, October 11, 2017, sinus rhythm with inferolateral ST-T wave abnormalities consistent with ischemia. \par \par

## 2020-08-13 NOTE — PHYSICAL EXAM
[General Appearance - Well Developed] : well developed [Well Groomed] : well groomed [Normal Appearance] : normal appearance [No Deformities] : no deformities [General Appearance - Well Nourished] : well nourished [General Appearance - In No Acute Distress] : no acute distress [Normal Conjunctiva] : the conjunctiva exhibited no abnormalities [Eyelids - No Xanthelasma] : the eyelids demonstrated no xanthelasmas [Normal Oral Mucosa] : normal oral mucosa [No Oral Pallor] : no oral pallor [No Oral Cyanosis] : no oral cyanosis [Normal Jugular Venous A Waves Present] : normal jugular venous A waves present [Normal Jugular Venous V Waves Present] : normal jugular venous V waves present [No Jugular Venous Abdul A Waves] : no jugular venous abdul A waves [Auscultation Breath Sounds / Voice Sounds] : lungs were clear to auscultation bilaterally [Exaggerated Use Of Accessory Muscles For Inspiration] : no accessory muscle use [Respiration, Rhythm And Depth] : normal respiratory rhythm and effort [Heart Rate And Rhythm] : heart rate and rhythm were normal [Heart Sounds] : normal S1 and S2 [Murmurs] : no murmurs present [Abdomen Soft] : soft [Abdomen Tenderness] : non-tender [Abdomen Mass (___ Cm)] : no abdominal mass palpated [Abnormal Walk] : normal gait [Gait - Sufficient For Exercise Testing] : the gait was sufficient for exercise testing [Cyanosis, Localized] : no localized cyanosis [Petechial Hemorrhages (___cm)] : no petechial hemorrhages [Nail Clubbing] : no clubbing of the fingernails [Skin Color & Pigmentation] : normal skin color and pigmentation [] : no rash [No Venous Stasis] : no venous stasis [Skin Lesions] : no skin lesions [No Skin Ulcers] : no skin ulcer [No Xanthoma] : no  xanthoma was observed [Oriented To Time, Place, And Person] : oriented to person, place, and time [Affect] : the affect was normal [Mood] : the mood was normal [No Anxiety] : not feeling anxious

## 2020-08-20 ENCOUNTER — APPOINTMENT (OUTPATIENT)
Dept: CARDIOLOGY | Facility: CLINIC | Age: 70
End: 2020-08-20
Payer: MEDICARE

## 2020-08-20 VITALS
HEIGHT: 67 IN | TEMPERATURE: 97.8 F | DIASTOLIC BLOOD PRESSURE: 70 MMHG | HEART RATE: 68 BPM | BODY MASS INDEX: 32.18 KG/M2 | OXYGEN SATURATION: 98 % | SYSTOLIC BLOOD PRESSURE: 130 MMHG | WEIGHT: 205 LBS

## 2020-08-20 DIAGNOSIS — Z00.00 ENCOUNTER FOR GENERAL ADULT MEDICAL EXAMINATION W/OUT ABNORMAL FINDINGS: ICD-10-CM

## 2020-08-20 PROCEDURE — 99215 OFFICE O/P EST HI 40 MIN: CPT

## 2020-08-20 NOTE — REASON FOR VISIT
[Follow-Up - Clinic] : a clinic follow-up of [FreeTextEntry2] : MILLER and fatigue, abnormal MPI stress test moderate anterior ischemia, CAROL LAD 1219/19 [FreeTextEntry1] : I saw this 70yr. old woman in f/u on 08/20/20 after cardiac cath.\par The wrist has healed well without any issues.\par  history of hypertension, dyslipidemia, moderate obesity, ocular stroke, sciatica, knee pain, limited ambulation, arthritis, depression, death of her son in 2016 and her  June 2018 with lung cancer, abnormal MPI stress test moderate anterior ischemia, CAROL LAD 1219/19. \par \par Cardiovascular review of systems is negative for exertional chest pain, dyspnea,  palpitations, or syncope.  No PND, orthopnea, or leg edema.  No bleeding or black stool.  The patient has dizziness consistent with vertigo. \par \par Patient is participating in cardiac rehab 3 x week. Patient will continue rehab, forms completed today.    \par \par Lexascan Myoview stress test November 2019 LVEF 57%, moderate anterior ischemia seen on images directly reviewed by me, nondiagnostic EKG response with exaggeration of the baseline changes, no chest pain, baseline sinus rhythm NSST\par \par Echocardiogram April 2019, LVEF 60%, mild diastolic dysfunction, trace MR TR, normal RVSP\par \par Carotid and abdominal ultrasound April 2019, mild nonobstructive plaque, normal abdominal aortic size\par \par 2-D echo October 2017 LVEF 60-65%, mild diastolic dysfunction, mild MR, normal PASP.\par  \par Carotid and abdominal ultrasound October 2017, 2.6cm\par \par Lexascan Myoview stress test, November 2017, LVEF 57%, mild midanterior ischemic defect, nonischemic EKG response, no anginal symptoms, baseline sinus rhythm NSST\par \par EKG, October 11, 2017, sinus rhythm with inferolateral ST-T wave abnormalities consistent with ischemia. \par \par

## 2020-08-20 NOTE — PHYSICAL EXAM
[General Appearance - Well Developed] : well developed [Well Groomed] : well groomed [Normal Appearance] : normal appearance [General Appearance - Well Nourished] : well nourished [No Deformities] : no deformities [General Appearance - In No Acute Distress] : no acute distress [Normal Conjunctiva] : the conjunctiva exhibited no abnormalities [Eyelids - No Xanthelasma] : the eyelids demonstrated no xanthelasmas [Normal Oral Mucosa] : normal oral mucosa [No Oral Pallor] : no oral pallor [No Oral Cyanosis] : no oral cyanosis [Normal Jugular Venous A Waves Present] : normal jugular venous A waves present [Normal Jugular Venous V Waves Present] : normal jugular venous V waves present [No Jugular Venous Abdul A Waves] : no jugular venous abdul A waves [Respiration, Rhythm And Depth] : normal respiratory rhythm and effort [Exaggerated Use Of Accessory Muscles For Inspiration] : no accessory muscle use [Auscultation Breath Sounds / Voice Sounds] : lungs were clear to auscultation bilaterally [Heart Rate And Rhythm] : heart rate and rhythm were normal [Murmurs] : no murmurs present [Heart Sounds] : normal S1 and S2 [Abdomen Soft] : soft [Abdomen Tenderness] : non-tender [Abnormal Walk] : normal gait [Abdomen Mass (___ Cm)] : no abdominal mass palpated [Gait - Sufficient For Exercise Testing] : the gait was sufficient for exercise testing [Nail Clubbing] : no clubbing of the fingernails [Petechial Hemorrhages (___cm)] : no petechial hemorrhages [Cyanosis, Localized] : no localized cyanosis [Skin Color & Pigmentation] : normal skin color and pigmentation [] : no rash [No Venous Stasis] : no venous stasis [Skin Lesions] : no skin lesions [No Xanthoma] : no  xanthoma was observed [No Skin Ulcers] : no skin ulcer [Oriented To Time, Place, And Person] : oriented to person, place, and time [Affect] : the affect was normal [Mood] : the mood was normal [No Anxiety] : not feeling anxious

## 2020-08-20 NOTE — ASSESSMENT
[FreeTextEntry1] : Charlotte is a 70-year-old female  with medical history detailed above and active medical issues including:\par \par - CAD, abnormal MPI stress test moderate anterior ischemia, CAROL LAD 1219/19 on plavix, asa, crestor, toprol \par \par -Hypertension at goal less than 130/80\par \par -Dyslipidemia on Crestor well tolerated. \par \par -History of ocular stroke with left eye blindness in 2014. \par \par -Anxiety and depression after son passed away in 2016. \par \par -Chronic back pain, sciatica, and knee pain limited ambulation. \par \par -Tobacco addiction, quit smoking smoking Dec 2019. Smoking cessation discussed at length. \par \par Patient was seen in cardiology follow-up after cardiac catherization. Plavix added to medial regimen. Repeat labs will be ordered.Doing well. No access issues\par \par \par \par

## 2020-10-26 ENCOUNTER — NON-APPOINTMENT (OUTPATIENT)
Age: 70
End: 2020-10-26

## 2020-12-10 ENCOUNTER — APPOINTMENT (OUTPATIENT)
Dept: CARDIOLOGY | Facility: CLINIC | Age: 70
End: 2020-12-10
Payer: MEDICARE

## 2020-12-10 VITALS
WEIGHT: 208 LBS | HEART RATE: 69 BPM | TEMPERATURE: 97.3 F | SYSTOLIC BLOOD PRESSURE: 122 MMHG | HEIGHT: 67 IN | OXYGEN SATURATION: 99 % | DIASTOLIC BLOOD PRESSURE: 70 MMHG | BODY MASS INDEX: 32.65 KG/M2

## 2020-12-10 PROCEDURE — 99214 OFFICE O/P EST MOD 30 MIN: CPT

## 2020-12-10 RX ORDER — CLOPIDOGREL BISULFATE 75 MG/1
75 TABLET, FILM COATED ORAL DAILY
Qty: 90 | Refills: 1 | Status: DISCONTINUED | COMMUNITY
Start: 2019-12-12 | End: 2020-12-10

## 2020-12-10 NOTE — ASSESSMENT
[FreeTextEntry1] : Charlotte is a 70-year-old female  with medical history detailed above and active medical issues including:\par \par - CAD, abnormal MPI stress test moderate anterior ischemia, CAROL LAD 1219/19 on plavix, asa, crestor, toprol . Plavix discontinued today 1 year after PCI CAROL. \par \par -Hypertension at goal less than 130/80\par \par -Dyslipidemia on Crestor well tolerated. \par \par -History of ocular stroke with left eye blindness in 2014. \par \par -Anxiety and depression after son passed away in 2016. \par \par -Chronic back pain, sciatica, and knee pain limited ambulation. \par \par -Tobacco addiction, quit smoking smoking Dec 2019.  Continued occasional cigarette smoking. Smoking cessation discussed at length. \par \par Advised patient to follow active lifestyle with regular cardiovascular exercise. Patient educated on lifestyle and diet modification with low sodium low fat diet and avoidance of excessive alcohol. Patient is aware to call with any symptoms or concerns.\par \par Cardiology follow-up 6 months with  2-D echocardiogram to assess LV systolic function, structural heart disease.\par \par Charlotte will follow up with Dr. Kael Garcia for primary care. \par \par

## 2020-12-10 NOTE — REASON FOR VISIT
[Follow-Up - Clinic] : a clinic follow-up of [FreeTextEntry2] : MILLER and fatigue, abnormal MPI stress test moderate anterior ischemia, CAROL LAD 1219/19 [FreeTextEntry1] : Charlotte is a 70-year-old female with history of hypertension, dyslipidemia, moderate obesity, ocular stroke, sciatica, knee pain, limited ambulation, arthritis, depression, death of her son in 2016 and her  June 2018 with lung cancer, abnormal MPI stress test moderate anterior ischemia, CAROL LAD 1219/19. \par \par Cardiovascular review of systems is negative for exertional chest pain, dyspnea,  palpitations, or syncope.  No PND, orthopnea, or leg edema.  No bleeding or black stool.  The patient has dizziness consistent with vertigo. \par \par No exercise routine.  Patient is walking 15 minutes without exertional chest pain   \par \par Lexascan Myoview stress test November 2019 LVEF 57%, moderate anterior ischemia seen on images directly reviewed by me, nondiagnostic EKG response with exaggeration of the baseline changes, no chest pain, baseline sinus rhythm NSST\par \par Echocardiogram April 2019, LVEF 60%, mild diastolic dysfunction, trace MR TR, normal RVSP\par \par Carotid and abdominal ultrasound April 2019, mild nonobstructive plaque, normal abdominal aortic size\par \par 2-D echo October 2017 LVEF 60-65%, mild diastolic dysfunction, mild MR, normal PASP.\par  \par Carotid and abdominal ultrasound October 2017, 2.6cm\par \par Lexascan Myoview stress test, November 2017, LVEF 57%, mild midanterior ischemic defect, nonischemic EKG response, no anginal symptoms, baseline sinus rhythm NSST\par \par EKG, October 11, 2017, sinus rhythm with inferolateral ST-T wave abnormalities consistent with ischemia. \par \par

## 2020-12-10 NOTE — PHYSICAL EXAM
[General Appearance - Well Developed] : well developed [Normal Appearance] : normal appearance [Well Groomed] : well groomed [General Appearance - Well Nourished] : well nourished [No Deformities] : no deformities [General Appearance - In No Acute Distress] : no acute distress [Normal Conjunctiva] : the conjunctiva exhibited no abnormalities [Eyelids - No Xanthelasma] : the eyelids demonstrated no xanthelasmas [Normal Oral Mucosa] : normal oral mucosa [No Oral Pallor] : no oral pallor [No Oral Cyanosis] : no oral cyanosis [Normal Jugular Venous A Waves Present] : normal jugular venous A waves present [Normal Jugular Venous V Waves Present] : normal jugular venous V waves present [No Jugular Venous Abdul A Waves] : no jugular venous abdul A waves [Respiration, Rhythm And Depth] : normal respiratory rhythm and effort [Exaggerated Use Of Accessory Muscles For Inspiration] : no accessory muscle use [Auscultation Breath Sounds / Voice Sounds] : lungs were clear to auscultation bilaterally [Heart Rate And Rhythm] : heart rate and rhythm were normal [Heart Sounds] : normal S1 and S2 [Murmurs] : no murmurs present [Abdomen Soft] : soft [Abdomen Tenderness] : non-tender [Abdomen Mass (___ Cm)] : no abdominal mass palpated [Abnormal Walk] : normal gait [Gait - Sufficient For Exercise Testing] : the gait was sufficient for exercise testing [Nail Clubbing] : no clubbing of the fingernails [Cyanosis, Localized] : no localized cyanosis [Petechial Hemorrhages (___cm)] : no petechial hemorrhages [Skin Color & Pigmentation] : normal skin color and pigmentation [] : no rash [No Venous Stasis] : no venous stasis [Skin Lesions] : no skin lesions [No Skin Ulcers] : no skin ulcer [No Xanthoma] : no  xanthoma was observed [Oriented To Time, Place, And Person] : oriented to person, place, and time [Affect] : the affect was normal [Mood] : the mood was normal [No Anxiety] : not feeling anxious

## 2021-06-17 ENCOUNTER — APPOINTMENT (OUTPATIENT)
Dept: CARDIOLOGY | Facility: CLINIC | Age: 71
End: 2021-06-17
Payer: MEDICARE

## 2021-06-17 ENCOUNTER — NON-APPOINTMENT (OUTPATIENT)
Age: 71
End: 2021-06-17

## 2021-06-17 VITALS
DIASTOLIC BLOOD PRESSURE: 78 MMHG | SYSTOLIC BLOOD PRESSURE: 124 MMHG | HEART RATE: 63 BPM | WEIGHT: 208 LBS | BODY MASS INDEX: 33.43 KG/M2 | HEIGHT: 66 IN | OXYGEN SATURATION: 97 % | TEMPERATURE: 97.6 F

## 2021-06-17 PROCEDURE — 93306 TTE W/DOPPLER COMPLETE: CPT

## 2021-06-17 PROCEDURE — 99214 OFFICE O/P EST MOD 30 MIN: CPT

## 2021-06-17 NOTE — ASSESSMENT
[FreeTextEntry1] : Charlotte is a 71-year-old female  with medical history detailed above and active medical issues including:\par \par - CAD, abnormal MPI stress test moderate anterior ischemia, CAROL LAD 1219/19 on plavix, asa, crestor, toprol . Plavix discontinued today 1 year after PCI CAROL. \par \par - Hypertension at goal less than 130/80\par \par - Dyslipidemia on Crestor well tolerated. \par \par - History of ocular stroke with left eye blindness in 2014. \par \par - Anxiety and depression after son passed away in 2016. \par \par - Chronic back pain, sciatica, and knee pain limited ambulation. \par \par - Tobacco addiction, quit smoking smoking Dec 2019.  Continued occasional cigarette smoking. Smoking cessation discussed at length. \par \par Advised patient to follow active lifestyle with regular cardiovascular exercise. Patient educated on lifestyle and diet modification with low sodium low fat diet and avoidance of excessive alcohol. Patient is aware to call with any symptoms or concerns.\par \par Cardiology follow-up 6 months.  Current cardiac medications remain unchanged. Repeat labs will be ordered with PMD.\par \par Charlotte will follow up with Dr. Kael Garcia for primary care. \par \par

## 2021-06-17 NOTE — REASON FOR VISIT
[Follow-Up - Clinic] : a clinic follow-up of [Other: ____] : [unfilled] [FreeTextEntry1] : Charlotte is a 71-year-old female with history of hypertension, dyslipidemia, moderate obesity, ocular stroke, sciatica, knee pain, limited ambulation, arthritis, depression, death of her son in 2016 and her  June 2018 with lung cancer, abnormal MPI stress test moderate anterior ischemia, CAROL LAD 1219/19. \par \par Cardiovascular review of systems is negative for exertional chest pain, dyspnea,  palpitations, or syncope.  No PND, orthopnea, or leg edema.  No bleeding or black stool.  The patient has dizziness consistent with vertigo. \par \par No exercise routine.  Patient is walking 15 minutes without exertional chest pain   \par \par Echocardiogram June 2021, LVEF 60%, concentric LVH mild MR, normal RVSP\par \par Lexascan Myoview stress test November 2019 LVEF 57%, moderate anterior ischemia seen on images directly reviewed by me, nondiagnostic EKG response with exaggeration of the baseline changes, no chest pain, baseline sinus rhythm NSST\par \par Echocardiogram April 2019, LVEF 60%, mild diastolic dysfunction, trace MR TR, normal RVSP\par \par Carotid and abdominal ultrasound April 2019, mild nonobstructive plaque, normal abdominal aortic size\par \par 2-D echo October 2017 LVEF 60-65%, mild diastolic dysfunction, mild MR, normal PASP.\par  \par Carotid and abdominal ultrasound October 2017, 2.6cm\par \par Lexascan Myoview stress test, November 2017, LVEF 57%, mild midanterior ischemic defect, nonischemic EKG response, no anginal symptoms, baseline sinus rhythm NSST\par \par EKG, October 11, 2017, sinus rhythm with inferolateral ST-T wave abnormalities consistent with ischemia. \par \par  [FreeTextEntry2] : MILLER and fatigue, abnormal MPI stress test moderate anterior ischemia, CAROL LAD 1219/19

## 2021-06-17 NOTE — PHYSICAL EXAM
[General Appearance - Well Developed] : well developed [Normal Appearance] : normal appearance [Well Groomed] : well groomed [General Appearance - Well Nourished] : well nourished [No Deformities] : no deformities [General Appearance - In No Acute Distress] : no acute distress [Eyelids - No Xanthelasma] : the eyelids demonstrated no xanthelasmas [Normal Oral Mucosa] : normal oral mucosa [No Oral Pallor] : no oral pallor [No Oral Cyanosis] : no oral cyanosis [Normal Jugular Venous A Waves Present] : normal jugular venous A waves present [Normal Jugular Venous V Waves Present] : normal jugular venous V waves present [No Jugular Venous Abdul A Waves] : no jugular venous abdul A waves [Respiration, Rhythm And Depth] : normal respiratory rhythm and effort [Exaggerated Use Of Accessory Muscles For Inspiration] : no accessory muscle use [Auscultation Breath Sounds / Voice Sounds] : lungs were clear to auscultation bilaterally [Heart Rate And Rhythm] : heart rate and rhythm were normal [Heart Sounds] : normal S1 and S2 [Murmurs] : no murmurs present [Abdomen Soft] : soft [Abdomen Tenderness] : non-tender [Abdomen Mass (___ Cm)] : no abdominal mass palpated [Abnormal Walk] : normal gait [Gait - Sufficient For Exercise Testing] : the gait was sufficient for exercise testing [Nail Clubbing] : no clubbing of the fingernails [Cyanosis, Localized] : no localized cyanosis [Petechial Hemorrhages (___cm)] : no petechial hemorrhages [] : no rash [Skin Color & Pigmentation] : normal skin color and pigmentation [No Venous Stasis] : no venous stasis [Skin Lesions] : no skin lesions [No Skin Ulcers] : no skin ulcer [No Xanthoma] : no  xanthoma was observed [Oriented To Time, Place, And Person] : oriented to person, place, and time [Affect] : the affect was normal [Mood] : the mood was normal [No Anxiety] : not feeling anxious [Well Developed] : well developed [Well Nourished] : well nourished [No Acute Distress] : no acute distress [Normal Conjunctiva] : normal conjunctiva [Normal Venous Pressure] : normal venous pressure [No Carotid Bruit] : no carotid bruit [Normal S1, S2] : normal S1, S2 [No Murmur] : no murmur [No Rub] : no rub [No Gallop] : no gallop [Clear Lung Fields] : clear lung fields [Good Air Entry] : good air entry [No Respiratory Distress] : no respiratory distress  [Soft] : abdomen soft [Non Tender] : non-tender [No Masses/organomegaly] : no masses/organomegaly [Normal Bowel Sounds] : normal bowel sounds [Normal Gait] : normal gait [No Edema] : no edema [No Cyanosis] : no cyanosis [No Clubbing] : no clubbing [No Varicosities] : no varicosities [No Rash] : no rash [Moves all extremities] : moves all extremities [No Skin Lesions] : no skin lesions [No Focal Deficits] : no focal deficits [Normal Speech] : normal speech [Alert and Oriented] : alert and oriented [Normal memory] : normal memory

## 2021-07-01 ENCOUNTER — RX RENEWAL (OUTPATIENT)
Age: 71
End: 2021-07-01

## 2021-11-22 ENCOUNTER — NON-APPOINTMENT (OUTPATIENT)
Age: 71
End: 2021-11-22

## 2021-12-14 ENCOUNTER — APPOINTMENT (OUTPATIENT)
Dept: CARDIOLOGY | Facility: CLINIC | Age: 71
End: 2021-12-14
Payer: MEDICARE

## 2021-12-14 VITALS
DIASTOLIC BLOOD PRESSURE: 70 MMHG | OXYGEN SATURATION: 94 % | WEIGHT: 198 LBS | HEART RATE: 70 BPM | BODY MASS INDEX: 31.82 KG/M2 | SYSTOLIC BLOOD PRESSURE: 112 MMHG | TEMPERATURE: 97.5 F | HEIGHT: 66 IN

## 2021-12-14 PROCEDURE — 99214 OFFICE O/P EST MOD 30 MIN: CPT

## 2021-12-14 NOTE — PHYSICAL EXAM
[Well Developed] : well developed [Well Nourished] : well nourished [No Acute Distress] : no acute distress [Normal Venous Pressure] : normal venous pressure [No Carotid Bruit] : no carotid bruit [Normal S1, S2] : normal S1, S2 [No Murmur] : no murmur [No Rub] : no rub [No Gallop] : no gallop [Clear Lung Fields] : clear lung fields [Good Air Entry] : good air entry [No Respiratory Distress] : no respiratory distress  [Soft] : abdomen soft [Non Tender] : non-tender [No Masses/organomegaly] : no masses/organomegaly [Normal Bowel Sounds] : normal bowel sounds [Normal Gait] : normal gait [No Edema] : no edema [No Cyanosis] : no cyanosis [No Clubbing] : no clubbing [No Varicosities] : no varicosities [No Rash] : no rash [No Skin Lesions] : no skin lesions [Moves all extremities] : moves all extremities [No Focal Deficits] : no focal deficits [Normal Speech] : normal speech [Alert and Oriented] : alert and oriented [Normal memory] : normal memory [General Appearance - Well Developed] : well developed [Normal Appearance] : normal appearance [Well Groomed] : well groomed [General Appearance - Well Nourished] : well nourished [No Deformities] : no deformities [General Appearance - In No Acute Distress] : no acute distress [Normal Conjunctiva] : the conjunctiva exhibited no abnormalities [Eyelids - No Xanthelasma] : the eyelids demonstrated no xanthelasmas [Normal Oral Mucosa] : normal oral mucosa [No Oral Pallor] : no oral pallor [No Oral Cyanosis] : no oral cyanosis [Normal Jugular Venous A Waves Present] : normal jugular venous A waves present [Normal Jugular Venous V Waves Present] : normal jugular venous V waves present [No Jugular Venous Abdul A Waves] : no jugular venous abdul A waves [Respiration, Rhythm And Depth] : normal respiratory rhythm and effort [Exaggerated Use Of Accessory Muscles For Inspiration] : no accessory muscle use [Auscultation Breath Sounds / Voice Sounds] : lungs were clear to auscultation bilaterally [Heart Rate And Rhythm] : heart rate and rhythm were normal [Heart Sounds] : normal S1 and S2 [Murmurs] : no murmurs present [Abdomen Soft] : soft [Abdomen Tenderness] : non-tender [Abdomen Mass (___ Cm)] : no abdominal mass palpated [Abnormal Walk] : normal gait [Gait - Sufficient For Exercise Testing] : the gait was sufficient for exercise testing [Nail Clubbing] : no clubbing of the fingernails [Cyanosis, Localized] : no localized cyanosis [Petechial Hemorrhages (___cm)] : no petechial hemorrhages [Skin Color & Pigmentation] : normal skin color and pigmentation [] : no rash [No Venous Stasis] : no venous stasis [Skin Lesions] : no skin lesions [No Skin Ulcers] : no skin ulcer [No Xanthoma] : no  xanthoma was observed [Oriented To Time, Place, And Person] : oriented to person, place, and time [Affect] : the affect was normal [Mood] : the mood was normal [No Anxiety] : not feeling anxious

## 2021-12-14 NOTE — REASON FOR VISIT
[Other: ____] : [unfilled] [Follow-Up - Clinic] : a clinic follow-up of [FreeTextEntry1] : Charlotte is a 71-year-old female with history of hypertension, dyslipidemia, moderate obesity, ocular stroke, sciatica, knee pain, limited ambulation, arthritis, depression, death of her son in 2016 and her  June 2018 with lung cancer, abnormal MPI stress test moderate anterior ischemia, CAROL LAD 1219/19. \par \par Patient has dyspnea with moderate exertion.  Cardiovascular review of systems is negative for exertional chest pain,  palpitations, or syncope.  No PND, orthopnea, or leg edema.  No bleeding or black stool.  The patient has dizziness consistent with vertigo. \par \par No exercise routine.  Patient is walking 15 minutes without exertional chest pain   \par \par Echocardiogram June 2021, LVEF 60%, concentric LVH mild MR, normal RVSP\par \par Lexascan Myoview stress test November 2019 LVEF 57%, moderate anterior ischemia seen on images directly reviewed by me, nondiagnostic EKG response with exaggeration of the baseline changes, no chest pain, baseline sinus rhythm NSST\par \par Echocardiogram April 2019, LVEF 60%, mild diastolic dysfunction, trace MR TR, normal RVSP\par \par Carotid and abdominal ultrasound April 2019, mild nonobstructive plaque, normal abdominal aortic size\par \par 2-D echo October 2017 LVEF 60-65%, mild diastolic dysfunction, mild MR, normal PASP.\par  \par Carotid and abdominal ultrasound October 2017, 2.6cm\par \par Lexascan Myoview stress test, November 2017, LVEF 57%, mild midanterior ischemic defect, nonischemic EKG response, no anginal symptoms, baseline sinus rhythm NSST\par \par EKG, October 11, 2017, sinus rhythm with inferolateral ST-T wave abnormalities consistent with ischemia. \par \par  [FreeTextEntry2] : MILLER and fatigue, abnormal MPI stress test moderate anterior ischemia, CAROL LAD 1219/19

## 2021-12-14 NOTE — ASSESSMENT
[FreeTextEntry1] : Charlotte is a 71-year-old female  with medical history detailed above and active medical issues including:\par \par - CAD, abnormal MPI stress test moderate anterior ischemia, CAROL LAD 1219/19 on asa, crestor, toprol .  Patient will have noninvasive testing with a exercise Myoview stress test to assess for obstructive CAD, exercise-induced arrhythmia,  blood pressure response, echocardiogram for LVEF, wall motion, structural heart disease,  carotid and abdominal ultrasound to assess for obstructive PAD. \par \par - Hypertension average resting home BPs at guideline goal on Toprol, Lasix\par \par - Dyslipidemia on Crestor well tolerated. \par \par - History of ocular stroke with left eye blindness in 2014. \par \par - Anxiety and depression after son passed away in 2016,  passed away 2018 lung cancer. \par \par - Chronic back pain, sciatica, and knee pain limited ambulation. \par \par - Tobacco addiction, quit smoking smoking Dec 2019.  Continued occasional cigarette smoking. Smoking cessation discussed at length. \par \par Advised patient to follow active lifestyle with regular cardiovascular exercise. Patient educated on lifestyle and diet modification with low sodium low fat diet and avoidance of excessive alcohol. Patient is aware to call with any symptoms or concerns.\par \par Patient will be seen in cardiology follow-up after noninvasive testing.  Current cardiac medications remain unchanged. Repeat labs will be ordered with PMD.\par \par Charlotte will follow up with Dr. Kael Garcia for primary care. \par \par

## 2021-12-30 ENCOUNTER — TRANSCRIPTION ENCOUNTER (OUTPATIENT)
Age: 71
End: 2021-12-30

## 2022-01-17 ENCOUNTER — RX RENEWAL (OUTPATIENT)
Age: 72
End: 2022-01-17

## 2022-06-02 ENCOUNTER — APPOINTMENT (OUTPATIENT)
Dept: CARDIOLOGY | Facility: CLINIC | Age: 72
End: 2022-06-02
Payer: MEDICARE

## 2022-06-02 VITALS
OXYGEN SATURATION: 98 % | TEMPERATURE: 98.4 F | WEIGHT: 200 LBS | HEIGHT: 66 IN | HEART RATE: 62 BPM | DIASTOLIC BLOOD PRESSURE: 64 MMHG | BODY MASS INDEX: 32.14 KG/M2 | SYSTOLIC BLOOD PRESSURE: 132 MMHG

## 2022-06-02 PROCEDURE — 93979 VASCULAR STUDY: CPT

## 2022-06-02 PROCEDURE — 93880 EXTRACRANIAL BILAT STUDY: CPT

## 2022-06-02 PROCEDURE — 99215 OFFICE O/P EST HI 40 MIN: CPT

## 2022-06-02 PROCEDURE — 93306 TTE W/DOPPLER COMPLETE: CPT

## 2022-06-02 RX ORDER — LEVOTHYROXINE SODIUM 0.14 MG/1
137 TABLET ORAL
Qty: 30 | Refills: 0 | Status: ACTIVE | COMMUNITY
Start: 2021-06-10

## 2022-06-02 NOTE — REASON FOR VISIT
[Other: ____] : [unfilled] [Follow-Up - Clinic] : a clinic follow-up of [FreeTextEntry1] : Charlotte is a 72-year-old female with history of hypertension, dyslipidemia, moderate obesity, ocular stroke, sciatica, knee pain, limited ambulation, arthritis, depression, death of her son in 2016 and her  June 2018 with lung cancer, abnormal MPI stress test moderate anterior ischemia, CAROL LAD 1219/19. \par \par Patient has dyspnea with moderate exertion.  Cardiovascular review of systems is negative for exertional chest pain,  palpitations, or syncope.  No PND, orthopnea, or leg edema.  No bleeding or black stool.  The patient has dizziness consistent with vertigo. \par \par No exercise routine.  Patient is walking 15 minutes on occasion.  \par \par Echocardiogram June 2022 LVEF 55%, mild MR, normal RVSP.\par \par Carotid and abdominal ultrasound June 2022, mild nonobstructive plaque, normal abdominal aortic size.\par \par Echocardiogram June 2021, LVEF 60%, concentric LVH mild MR, normal RVSP\par \par Lexascan Myoview stress test November 2019 LVEF 57%, moderate anterior ischemia seen on images directly reviewed by me, nondiagnostic EKG response with exaggeration of the baseline changes, no chest pain, baseline sinus rhythm NSST\par \par Echocardiogram April 2019, LVEF 60%, mild diastolic dysfunction, trace MR TR, normal RVSP\par \par Carotid and abdominal ultrasound April 2019, mild nonobstructive plaque, normal abdominal aortic size\par \par 2-D echo October 2017 LVEF 60-65%, mild diastolic dysfunction, mild MR, normal PASP.\par  \par Carotid and abdominal ultrasound October 2017, 2.6cm\par \par Lexascan Myoview stress test, November 2017, LVEF 57%, mild midanterior ischemic defect, nonischemic EKG response, no anginal symptoms, baseline sinus rhythm NSST\par \par EKG, October 11, 2017, sinus rhythm with inferolateral ST-T wave abnormalities consistent with ischemia. \par \par  [FreeTextEntry2] : MILLER and fatigue, abnormal MPI stress test moderate anterior ischemia, CAROL LAD 1219/19

## 2022-06-02 NOTE — ASSESSMENT
[FreeTextEntry1] : Charlotte is a 71-year-old female  with medical history detailed above and active medical issues including:\par \par - CAD, abnormal MPI stress test moderate anterior ischemia, CAROL LAD 1219/19 on asa, crestor, toprol .  Patient will have noninvasive testing with a exercise Myoview stress test to assess for obstructive CAD, exercise-induced arrhythmia,  blood pressure response. \par \par - Hypertension average resting home BPs at guideline goal on Toprol, Lasix\par \par - Dyslipidemia on Crestor well tolerated. \par \par - History of ocular stroke with left eye blindness in 2014. \par \par - Anxiety and depression after son passed away in 2016,  passed away 2018 lung cancer. \par \par - Chronic back pain, sciatica, and knee pain limited ambulation. \par \par - Tobacco addiction, quit smoking smoking Dec 2019.  Continued occasional cigarette smoking. Smoking cessation discussed at length. \par \par Advised patient to follow active lifestyle with regular cardiovascular exercise. Patient educated on lifestyle and diet modification with low sodium low fat diet and avoidance of excessive alcohol. Patient is aware to call with any symptoms or concerns.\par \par Patient will be seen in cardiology follow-up after noninvasive testing.  Current cardiac medications remain unchanged. Repeat labs will be ordered with PMD.\par \par Charlotte will follow up with Dr. Kael Garcia for primary care. \par \par

## 2022-11-08 ENCOUNTER — APPOINTMENT (OUTPATIENT)
Dept: CARDIOLOGY | Facility: CLINIC | Age: 72
End: 2022-11-08
Payer: MEDICARE

## 2022-11-08 VITALS
DIASTOLIC BLOOD PRESSURE: 72 MMHG | BODY MASS INDEX: 32.65 KG/M2 | HEIGHT: 67 IN | SYSTOLIC BLOOD PRESSURE: 120 MMHG | OXYGEN SATURATION: 97 % | HEART RATE: 76 BPM | WEIGHT: 208 LBS

## 2022-11-08 PROCEDURE — 99215 OFFICE O/P EST HI 40 MIN: CPT

## 2022-11-08 RX ORDER — METHYLPREDNISOLONE 4 MG/1
4 TABLET ORAL
Qty: 21 | Refills: 0 | Status: DISCONTINUED | COMMUNITY
Start: 2021-12-01 | End: 2022-11-08

## 2022-11-08 NOTE — ASSESSMENT
[FreeTextEntry1] : Charlotte is a 72-year-old female  with medical history detailed above and active medical issues including:\par \par - CAD, abnormal MPI stress test moderate anterior ischemia, CAROL LAD 1219/19 on asa, crestor, toprol . Nonbstructive catheterization Aug 2020\par \par - Hypertension average resting home BPs at guideline goal on Toprol, Lasix\par \par - Dyslipidemia on Crestor well tolerated. \par \par - History of ocular stroke with left eye blindness in 2014. \par \par - Anxiety and depression after son passed away in 2016,  passed away 2018 lung cancer. \par \par - Chronic back pain, sciatica, and knee pain limited ambulation. \par \par - Tobacco addiction, quit smoking smoking Dec 2019.  Continued occasional cigarette smoking. Smoking cessation discussed at length. \par \par Advised patient to follow active lifestyle with regular cardiovascular exercise. Patient educated on lifestyle and diet modification with low sodium low fat diet and avoidance of excessive alcohol. Patient is aware to call with any symptoms or concerns.\par \par Patient will be seen in cardiology follow-up 6 months same-day echocardiogram.  Current cardiac medications remain unchanged. Repeat labs will be ordered with PMD.\par \par Charlotte will follow up with Dr. Kael Garcia for primary care. \par \par

## 2022-12-15 ENCOUNTER — OFFICE (OUTPATIENT)
Dept: URBAN - METROPOLITAN AREA CLINIC 97 | Facility: CLINIC | Age: 72
Setting detail: OPHTHALMOLOGY
End: 2022-12-15
Payer: MEDICARE

## 2022-12-15 DIAGNOSIS — H47.011: ICD-10-CM

## 2022-12-15 DIAGNOSIS — H16.223: ICD-10-CM

## 2022-12-15 DIAGNOSIS — Z96.1: ICD-10-CM

## 2022-12-15 DIAGNOSIS — H02.834: ICD-10-CM

## 2022-12-15 DIAGNOSIS — H47.212: ICD-10-CM

## 2022-12-15 DIAGNOSIS — H02.831: ICD-10-CM

## 2022-12-15 DIAGNOSIS — H20.011: ICD-10-CM

## 2022-12-15 DIAGNOSIS — H25.12: ICD-10-CM

## 2022-12-15 PROCEDURE — 92133 CPTRZD OPH DX IMG PST SGM ON: CPT | Performed by: OPHTHALMOLOGY

## 2022-12-15 PROCEDURE — 92014 COMPRE OPH EXAM EST PT 1/>: CPT | Performed by: OPHTHALMOLOGY

## 2022-12-15 PROCEDURE — 92083 EXTENDED VISUAL FIELD XM: CPT | Performed by: OPHTHALMOLOGY

## 2022-12-15 ASSESSMENT — REFRACTION_CURRENTRX
OD_SPHERE: -1.25
OS_OVR_VA: 20/
OD_AXIS: 175
OD_OVR_VA: 20/
OD_VPRISM_DIRECTION: SV
OD_AXIS: 005
OD_CYLINDER: +0.50
OS_AXIS: 000
OS_SPHERE: PLANO
OS_AXIS: 007
OS_VPRISM_DIRECTION: SV
OS_SPHERE: +4.00
OS_CYLINDER: +1.00
OS_OVR_VA: 20/
OD_VPRISM_DIRECTION: SV
OD_SPHERE: +4.00
OS_CYLINDER: SPH
OD_CYLINDER: +1.00
OS_VPRISM_DIRECTION: SV
OD_OVR_VA: 20/

## 2022-12-15 ASSESSMENT — AXIALLENGTH_DERIVED
OD_AL: 23.7912
OD_AL: 23.6925
OD_AL: 22.7041
OS_AL: 22.7947
OS_AL: 22.7493
OS_AL: 22.978

## 2022-12-15 ASSESSMENT — KERATOMETRY
OS_K1POWER_DIOPTERS: 43.25
OD_K2POWER_DIOPTERS: 44.00
OS_K2POWER_DIOPTERS: 44.00
OD_AXISANGLE_DEGREES: 129
OS_AXISANGLE_DEGREES: 058
OD_K1POWER_DIOPTERS: 43.25
METHOD_AUTO_MANUAL: AUTO

## 2022-12-15 ASSESSMENT — CONFRONTATIONAL VISUAL FIELD TEST (CVF)
OS_FINDINGS: FULL
OD_FINDINGS: FULL

## 2022-12-15 ASSESSMENT — REFRACTION_MANIFEST
OD_SPHERE: +2.50
OD_CYLINDER: +0.75
OS_AXIS: 180
OD_VA2: 20/25(J1)
OS_VA1: CF
OD_VA1: 20/30
OS_SPHERE: +1.50
OD_VA1: 20/25
OS_VA1: HM
OS_VA2: 20/25(J1)
OD_ADD: +2.75
OD_AXIS: 100
OS_CYLINDER: +1.25
OD_CYLINDER: -0.50
OS_CYLINDER: -1.00
OD_SPHERE: -1.00
OS_AXIS: 080
OS_SPHERE: +2.50
OD_AXIS: 160

## 2022-12-15 ASSESSMENT — SPHEQUIV_DERIVED
OD_SPHEQUIV: 2.25
OS_SPHEQUIV: 2.125
OS_SPHEQUIV: 2
OD_SPHEQUIV: -0.375
OD_SPHEQUIV: -0.625
OS_SPHEQUIV: 1.5

## 2022-12-15 ASSESSMENT — SUPERFICIAL PUNCTATE KERATITIS (SPK)
OD_SPK: 2+ 3+
OS_SPK: 2+ 3+

## 2022-12-15 ASSESSMENT — VISUAL ACUITY
OD_BCVA: NLP
OS_BCVA: 20/20-2

## 2022-12-15 ASSESSMENT — DRY EYES - PHYSICIAN NOTES
OD_GENERALCOMMENTS: INFERIORLY
OS_GENERALCOMMENTS: INFERIORLY

## 2022-12-15 ASSESSMENT — REFRACTION_AUTOREFRACTION
OS_CYLINDER: +1.50
OD_SPHERE: -0.75
OD_CYLINDER: +0.75
OS_AXIS: 173
OD_AXIS: 157
OS_SPHERE: +0.75

## 2022-12-15 ASSESSMENT — LID POSITION - COMMENTS
OD_COMMENTS: S/P BILATERAL UPPER BLEPHAROPLASTY
OS_COMMENTS: S/P BILATERAL UPPER BLEPHAROPLASTY

## 2023-05-08 ENCOUNTER — APPOINTMENT (OUTPATIENT)
Dept: CARDIOLOGY | Facility: CLINIC | Age: 73
End: 2023-05-08
Payer: SELF-PAY

## 2023-05-08 VITALS
HEART RATE: 70 BPM | SYSTOLIC BLOOD PRESSURE: 136 MMHG | BODY MASS INDEX: 30.61 KG/M2 | WEIGHT: 195 LBS | OXYGEN SATURATION: 97 % | DIASTOLIC BLOOD PRESSURE: 72 MMHG | HEIGHT: 67 IN

## 2023-05-08 PROCEDURE — 93306 TTE W/DOPPLER COMPLETE: CPT

## 2023-05-08 PROCEDURE — 99215 OFFICE O/P EST HI 40 MIN: CPT

## 2023-05-08 RX ORDER — POTASSIUM CHLORIDE 1500 MG/1
20 TABLET, FILM COATED, EXTENDED RELEASE ORAL
Qty: 90 | Refills: 0 | Status: DISCONTINUED | COMMUNITY
Start: 2020-03-10 | End: 2023-05-08

## 2023-05-08 NOTE — ASSESSMENT
[FreeTextEntry1] : Charlotte is a 73-year-old female  with medical history detailed above and active medical issues including:\par \par - CAD, abnormal MPI stress test moderate anterior ischemia, CAROL LAD 1219/19 on asa, crestor, toprol . Nonbstructive catheterization Aug 2020.  Coronary CTA and coronary calcium score ordered to assess for obstructive CAD and risk stratification.\par \par - Hypertension average resting home BPs at guideline goal on Toprol, Lasix\par \par - Dyslipidemia on Crestor well tolerated. \par \par - History of ocular stroke with left eye blindness in 2014. \par \par - Anxiety and depression after son passed away in 2016,  passed away 2018 lung cancer. \par \par - Chronic back pain, sciatica, and knee pain limited ambulation. \par \par - Tobacco addiction, quit smoking smoking Dec 2019.  Continued occasional cigarette smoking. Smoking cessation discussed at length. \par \par Advised patient to follow active lifestyle with regular cardiovascular exercise. Patient educated on lifestyle and diet modification with low sodium low fat diet and avoidance of excessive alcohol. Patient is aware to call with any symptoms or concerns.\par \par Patient will be seen in cardiology follow-up after noninvasive testing.  Current cardiac medications remain unchanged and renewals  are up to date. Repeat labs will be ordered with PMD.\par \par Charlotte will follow up with Dr. Kael Garcia for primary care. \par \par

## 2023-05-08 NOTE — REASON FOR VISIT
[Other: ____] : [unfilled] [Follow-Up - Clinic] : a clinic follow-up of [FreeTextEntry1] : Charlotte is a 73-year-old female with history of hypertension, dyslipidemia, moderate obesity, ocular stroke, sciatica, knee pain, limited ambulation, arthritis, depression, death of her son in 2016 and her  June 2018 with lung cancer, abnormal MPI stress test moderate anterior ischemia, CAROL LAD 1219/19. \par \par Patient has dyspnea with moderate exertion.  Cardiovascular review of systems is negative for exertional chest pain,  palpitations, or syncope.  No PND, orthopnea, or leg edema.  No bleeding or black stool.  The patient has dizziness consistent with vertigo. \par \par No exercise routine.  Patient is walking 15 minutes on occasion.  \par \par Echocardiogram today LVEF 55-60%, mild LVH, trace MR and TR.\par \par Echocardiogram June 2022 LVEF 55%, mild MR, normal RVSP.\par \par Carotid and abdominal ultrasound June 2022, mild nonobstructive plaque, normal abdominal aortic size.\par \par Echocardiogram June 2021, LVEF 60%, concentric LVH mild MR, normal RVSP\par \par Lexascan Myoview stress test November 2019 LVEF 57%, moderate anterior ischemia seen on images directly reviewed by me, nondiagnostic EKG response with exaggeration of the baseline changes, no chest pain, baseline sinus rhythm NSST\par \par Echocardiogram April 2019, LVEF 60%, mild diastolic dysfunction, trace MR TR, normal RVSP\par \par Carotid and abdominal ultrasound April 2019, mild nonobstructive plaque, normal abdominal aortic size\par \par 2-D echo October 2017 LVEF 60-65%, mild diastolic dysfunction, mild MR, normal PASP.\par  \par Carotid and abdominal ultrasound October 2017, 2.6cm\par \par Lexascan Myoview stress test, November 2017, LVEF 57%, mild midanterior ischemic defect, nonischemic EKG response, no anginal symptoms, baseline sinus rhythm NSST\par \par EKG, October 11, 2017, sinus rhythm with inferolateral ST-T wave abnormalities consistent with ischemia. \par \par  [FreeTextEntry2] : MILLER and fatigue, abnormal MPI stress test moderate anterior ischemia, CAROL LAD 1219/19

## 2023-07-11 ENCOUNTER — APPOINTMENT (OUTPATIENT)
Dept: CARDIOLOGY | Facility: CLINIC | Age: 73
End: 2023-07-11
Payer: MEDICARE

## 2023-07-11 VITALS
HEART RATE: 72 BPM | SYSTOLIC BLOOD PRESSURE: 136 MMHG | OXYGEN SATURATION: 96 % | DIASTOLIC BLOOD PRESSURE: 64 MMHG | WEIGHT: 200 LBS | HEIGHT: 67 IN | BODY MASS INDEX: 31.39 KG/M2

## 2023-07-11 PROCEDURE — 99214 OFFICE O/P EST MOD 30 MIN: CPT

## 2023-07-11 NOTE — REASON FOR VISIT
[Other: ____] : [unfilled] [Follow-Up - Clinic] : a clinic follow-up of [FreeTextEntry1] : Charlotte is a 73-year-old female with history of hypertension, dyslipidemia, moderate obesity, ocular stroke, sciatica, knee pain, limited ambulation, arthritis, depression, death of her son in 2016 and her  June 2018 with lung cancer, abnormal MPI stress test moderate anterior ischemia, CAROL LAD 1219/19. \par \par Cardiovascular review of systems is negative for exertional chest pain, dyspnea, palpitations, or syncope.  No PND, orthopnea, or leg edema.  No bleeding or black stool.  The patient has dizziness consistent with vertigo. \par \par No exercise routine.  Patient is walking 15 minutes on occasion.  \par \par Coronary CTA July 2023 mild nonobstructive plaque mid to distal LAD, RCA, LCx without high-grade stenosis\par \par Echocardiogram today LVEF 55-60%, mild LVH, trace MR and TR.\par \par Echocardiogram June 2022 LVEF 55%, mild MR, normal RVSP.\par \par Carotid and abdominal ultrasound June 2022, mild nonobstructive plaque, normal abdominal aortic size.\par \par Echocardiogram June 2021, LVEF 60%, concentric LVH mild MR, normal RVSP\par \par Lexascan Myoview stress test November 2019 LVEF 57%, moderate anterior ischemia seen on images directly reviewed by me, nondiagnostic EKG response with exaggeration of the baseline changes, no chest pain, baseline sinus rhythm NSST\par \par Echocardiogram April 2019, LVEF 60%, mild diastolic dysfunction, trace MR TR, normal RVSP\par \par Carotid and abdominal ultrasound April 2019, mild nonobstructive plaque, normal abdominal aortic size\par \par 2-D echo October 2017 LVEF 60-65%, mild diastolic dysfunction, mild MR, normal PASP.\par  \par Carotid and abdominal ultrasound October 2017, 2.6cm\par \par Lexascan Myoview stress test, November 2017, LVEF 57%, mild midanterior ischemic defect, nonischemic EKG response, no anginal symptoms, baseline sinus rhythm NSST\par \par EKG, October 11, 2017, sinus rhythm with inferolateral ST-T wave abnormalities consistent with ischemia. \par \par  [FreeTextEntry2] : MILLER and fatigue, abnormal MPI stress test moderate anterior ischemia, CAROL LAD 1219/19

## 2023-07-11 NOTE — ASSESSMENT
[FreeTextEntry1] : Charlotte is a 73-year-old female  with medical history detailed above and active medical issues including:\par \par - CAD, abnormal MPI stress test moderate anterior ischemia, CAROL LAD 1219/19 on asa, crestor, toprol . Nonbstructive catheterization Aug 2020.  Nonobstructive coronary CTA July 2023. \par \par - Hypertension average resting home BPs at guideline goal on Toprol, Lasix\par \par - Dyslipidemia on Crestor well tolerated. \par \par - History of ocular stroke with left eye blindness in 2014. \par \par - Anxiety and depression after son passed away in 2016,  passed away 2018 lung cancer. \par \par - Chronic back pain, sciatica, and knee pain limited ambulation. \par \par - Tobacco addiction, quit smoking smoking Dec 2019.  Continued occasional cigarette smoking. Smoking cessation discussed at length. \par \par Advised patient to follow active lifestyle with regular cardiovascular exercise. Patient educated on lifestyle and diet modification with low sodium low fat diet and avoidance of excessive alcohol. Patient is aware to call with any symptoms or concerns.\par \par Patient will be seen in cardiology follow-up after noninvasive testing.  Current cardiac medications remain unchanged and renewals  are up to date. Repeat labs will be ordered with PMD.\par \par Charlotte will follow up with Dr. Kael Garcia for primary care. \par \par

## 2023-10-19 ENCOUNTER — APPOINTMENT (OUTPATIENT)
Dept: ORTHOPEDIC SURGERY | Facility: CLINIC | Age: 73
End: 2023-10-19
Payer: MEDICARE

## 2023-10-19 PROCEDURE — 99214 OFFICE O/P EST MOD 30 MIN: CPT | Mod: 25

## 2023-10-19 PROCEDURE — 73565 X-RAY EXAM OF KNEES: CPT

## 2023-10-19 PROCEDURE — 20611 DRAIN/INJ JOINT/BURSA W/US: CPT | Mod: RT

## 2023-11-21 ENCOUNTER — APPOINTMENT (OUTPATIENT)
Dept: CARDIOLOGY | Facility: CLINIC | Age: 73
End: 2023-11-21
Payer: MEDICARE

## 2023-11-27 ENCOUNTER — APPOINTMENT (OUTPATIENT)
Dept: ORTHOPEDIC SURGERY | Facility: CLINIC | Age: 73
End: 2023-11-27
Payer: MEDICARE

## 2023-11-27 PROCEDURE — 99213 OFFICE O/P EST LOW 20 MIN: CPT

## 2023-11-28 LAB — HBA1C MFR BLD HPLC: 6.2

## 2023-11-30 ENCOUNTER — APPOINTMENT (OUTPATIENT)
Dept: CARDIOLOGY | Facility: CLINIC | Age: 73
End: 2023-11-30
Payer: MEDICARE

## 2023-11-30 VITALS
BODY MASS INDEX: 30.92 KG/M2 | HEART RATE: 79 BPM | DIASTOLIC BLOOD PRESSURE: 70 MMHG | WEIGHT: 197 LBS | OXYGEN SATURATION: 98 % | SYSTOLIC BLOOD PRESSURE: 136 MMHG | HEIGHT: 67 IN

## 2023-11-30 PROCEDURE — 99215 OFFICE O/P EST HI 40 MIN: CPT

## 2023-11-30 PROCEDURE — 93000 ELECTROCARDIOGRAM COMPLETE: CPT

## 2023-11-30 RX ORDER — OMEGA-3-ACID ETHYL ESTERS CAPSULES 1 G/1
1 CAPSULE, LIQUID FILLED ORAL
Qty: 120 | Refills: 0 | Status: ACTIVE | COMMUNITY
Start: 2022-12-06

## 2024-04-29 ENCOUNTER — APPOINTMENT (OUTPATIENT)
Dept: ORTHOPEDIC SURGERY | Facility: CLINIC | Age: 74
End: 2024-04-29
Payer: MEDICARE

## 2024-04-29 PROCEDURE — 73560 X-RAY EXAM OF KNEE 1 OR 2: CPT | Mod: 50

## 2024-04-29 PROCEDURE — 99213 OFFICE O/P EST LOW 20 MIN: CPT

## 2024-04-29 NOTE — HISTORY OF PRESENT ILLNESS
[de-identified] : Patient reports worsening pain in the anterior knee, despite PT that she recently completed.

## 2024-04-29 NOTE — PHYSICAL EXAM
[Normal Coordination] : normal coordination [Normal Sensation] : normal sensation [Normal Mood and Affect] : normal mood and affect [Oriented] : oriented [Able to Communicate] : able to communicate [Well Developed] : well developed [Well Nourished] : well nourished [Right] : right knee [3___] : hamstring 3[unfilled]/5 [Bilateral] : knee bilaterally [Lateral] : lateral [[place]] : skyline [AP Standing] : anteroposterior standing [There are no fractures, subluxations or dislocations. No significant abnormalities are seen] : There are no fractures, subluxations or dislocations. No significant abnormalities are seen [Degenerative change] : Degenerative change [Moderate patellofemoral OA] : Moderate patellofemoral OA [] : no extensor lag [TWNoteComboBox7] : flexion 120 degrees [de-identified] : extension 25 degrees

## 2024-04-29 NOTE — DISCUSSION/SUMMARY
[de-identified] : We discussed possible viscosupplementation, patient would benefit. We discussed continued formal PT, a home exercise program, ice therapy and the role of NSAIDS for the pain. These modalities will improve the patient's functionality in their activities of daily life.  Risks, benefits and contraindications were discussed.  The patient will follow up in 6 weeks or sooner as needed.

## 2024-05-09 ENCOUNTER — APPOINTMENT (OUTPATIENT)
Dept: ORTHOPEDIC SURGERY | Facility: CLINIC | Age: 74
End: 2024-05-09
Payer: MEDICARE

## 2024-05-09 PROCEDURE — 20611 DRAIN/INJ JOINT/BURSA W/US: CPT | Mod: RT

## 2024-05-09 NOTE — PROCEDURE
[Large Joint Injection] : Large joint injection [Right] : of the right [Knee] : knee [Pain] : pain [Inflammation] : inflammation [X-ray evidence of Osteoarthritis on this or prior visit] : x-ray evidence of Osteoarthritis on this or prior visit [Alcohol] : alcohol [Betadine] : betadine [Ethyl Chloride sprayed topically] : ethyl chloride sprayed topically [Durolane (60mg)] : 60mg of Durolane [] : Patient tolerated procedure well [Call if redness, pain or fever occur] : call if redness, pain or fever occur [Apply ice for 15min out of every hour for the next 12-24 hours as tolerated] : apply ice for 15 minutes out of every hour for the next 12-24 hours as tolerated [Patient was advised to rest the joint(s) for ____ days] : patient was advised to rest the joint(s) for [unfilled] days [Previous OTC use and PT nontherapeutic] : patient has tried OTC's including aspirin, Ibuprofen, Aleve, etc or prescription NSAIDS, and/or exercises at home and/or physical therapy without satisfactory response [Patient had decreased mobility in the joint] : patient had decreased mobility in the joint [Risks, benefits, alternatives discussed / Verbal consent obtained] : the risks benefits, and alternatives have been discussed, and verbal consent was obtained [Altered anatomic landmarks d/t erosive arthritis] : altered anatomic landmarks d/t erosive arthritis [All ultrasound images have been permanently captured and stored accordingly in our picture archiving and communication system] : All ultrasound images have been permanently captured and stored accordingly in our picture archiving and communication system [Visualization of the needle and placement of injection was performed without complication] : visualization of the needle and placement of injection was performed without complication

## 2024-06-03 DIAGNOSIS — I25.10 ATHEROSCLEROTIC HEART DISEASE OF NATIVE CORONARY ARTERY W/OUT ANGINA PECTORIS: ICD-10-CM

## 2024-06-03 DIAGNOSIS — R06.09 OTHER FORMS OF DYSPNEA: ICD-10-CM

## 2024-06-03 RX ORDER — FUROSEMIDE 20 MG/1
20 TABLET ORAL
Qty: 90 | Refills: 1 | Status: ACTIVE | COMMUNITY
Start: 2020-03-10 | End: 1900-01-01

## 2024-06-03 RX ORDER — METOPROLOL SUCCINATE 25 MG/1
25 TABLET, EXTENDED RELEASE ORAL DAILY
Qty: 90 | Refills: 1 | Status: ACTIVE | COMMUNITY
Start: 1900-01-01 | End: 1900-01-01

## 2024-06-03 RX ORDER — ROSUVASTATIN CALCIUM 20 MG/1
20 TABLET, FILM COATED ORAL DAILY
Qty: 90 | Refills: 1 | Status: ACTIVE | COMMUNITY
Start: 1900-01-01 | End: 1900-01-01

## 2024-06-12 ENCOUNTER — APPOINTMENT (OUTPATIENT)
Dept: ORTHOPEDIC SURGERY | Facility: CLINIC | Age: 74
End: 2024-06-12
Payer: MEDICARE

## 2024-06-12 PROCEDURE — 20611 DRAIN/INJ JOINT/BURSA W/US: CPT | Mod: RT

## 2024-06-12 PROCEDURE — 99214 OFFICE O/P EST MOD 30 MIN: CPT | Mod: 25

## 2024-06-12 RX ORDER — CYCLOBENZAPRINE HYDROCHLORIDE 5 MG/1
5 TABLET, FILM COATED ORAL
Qty: 20 | Refills: 1 | Status: ACTIVE | COMMUNITY
Start: 2024-06-12 | End: 1900-01-01

## 2024-06-12 NOTE — HISTORY OF PRESENT ILLNESS
[de-identified] : Patient is 4 weeks s/p visco injection and reports worsening pain. She was evaluated by her PCP and diagnosed with gout. She was prescribed colchicine

## 2024-06-12 NOTE — PHYSICAL EXAM
[Normal Coordination] : normal coordination [Normal Sensation] : normal sensation [Normal Mood and Affect] : normal mood and affect [Oriented] : oriented [Able to Communicate] : able to communicate [Well Developed] : well developed [Well Nourished] : well nourished [Right] : right knee [3___] : hamstring 3[unfilled]/5 [Bilateral] : knee bilaterally [Lateral] : lateral [[place]] : skyline [AP Standing] : anteroposterior standing [There are no fractures, subluxations or dislocations. No significant abnormalities are seen] : There are no fractures, subluxations or dislocations. No significant abnormalities are seen [Degenerative change] : Degenerative change [Moderate patellofemoral OA] : Moderate patellofemoral OA [] : no extensor lag [TWNoteComboBox7] : flexion 120 degrees [de-identified] : extension 25 degrees

## 2024-06-12 NOTE — DISCUSSION/SUMMARY
[Medication Risks Reviewed] : Medication risks reviewed [de-identified] : We discussed a home exercise program, ice therapy and the role of NSAIDS for the pain. These modalities will improve the patient's functionality in their activities of daily life.  Risks, benefits and contraindications were discussed.  The patient will follow up in 3 weeks or sooner as needed.

## 2024-06-19 PROBLEM — Z95.5 STATUS POST INSERTION OF DRUG-ELUTING STENT INTO LEFT ANTERIOR DESCENDING ARTERY FOR CORONARY ARTERY DISEASE: Status: ACTIVE | Noted: 2020-01-29

## 2024-06-19 PROBLEM — R94.39 ABNORMAL STRESS TEST: Status: ACTIVE | Noted: 2019-12-12

## 2024-06-19 PROBLEM — R07.9 CHEST PAIN: Status: ACTIVE | Noted: 2020-08-13

## 2024-06-19 PROBLEM — I10 HYPERTENSION: Status: ACTIVE | Noted: 2017-11-17

## 2024-06-19 PROBLEM — R53.83 FATIGUE: Status: ACTIVE | Noted: 2019-12-12

## 2024-06-19 PROBLEM — E78.5 HYPERLIPIDEMIA: Status: ACTIVE | Noted: 2017-11-17

## 2024-06-19 PROBLEM — I24.0 OCCLUSION OF LEFT ANTERIOR DESCENDING (LAD) ARTERY: Status: ACTIVE | Noted: 2019-12-12

## 2024-06-24 ENCOUNTER — NON-APPOINTMENT (OUTPATIENT)
Age: 74
End: 2024-06-24

## 2024-06-26 ENCOUNTER — APPOINTMENT (OUTPATIENT)
Dept: CARDIOLOGY | Facility: CLINIC | Age: 74
End: 2024-06-26
Payer: MEDICARE

## 2024-06-26 ENCOUNTER — APPOINTMENT (OUTPATIENT)
Dept: ORTHOPEDIC SURGERY | Facility: CLINIC | Age: 74
End: 2024-06-26
Payer: MEDICARE

## 2024-06-26 VITALS
HEART RATE: 66 BPM | DIASTOLIC BLOOD PRESSURE: 66 MMHG | BODY MASS INDEX: 31.71 KG/M2 | OXYGEN SATURATION: 96 % | HEIGHT: 67 IN | WEIGHT: 202 LBS | SYSTOLIC BLOOD PRESSURE: 124 MMHG

## 2024-06-26 DIAGNOSIS — R53.83 OTHER FATIGUE: ICD-10-CM

## 2024-06-26 DIAGNOSIS — M17.11 UNILATERAL PRIMARY OSTEOARTHRITIS, RIGHT KNEE: ICD-10-CM

## 2024-06-26 DIAGNOSIS — E78.5 HYPERLIPIDEMIA, UNSPECIFIED: ICD-10-CM

## 2024-06-26 DIAGNOSIS — I10 ESSENTIAL (PRIMARY) HYPERTENSION: ICD-10-CM

## 2024-06-26 DIAGNOSIS — R07.9 CHEST PAIN, UNSPECIFIED: ICD-10-CM

## 2024-06-26 DIAGNOSIS — Z95.5 PRESENCE OF CORONARY ANGIOPLASTY IMPLANT AND GRAFT: ICD-10-CM

## 2024-06-26 DIAGNOSIS — R94.39 ABNORMAL RESULT OF OTHER CARDIOVASCULAR FUNCTION STUDY: ICD-10-CM

## 2024-06-26 DIAGNOSIS — I24.0 ACUTE CORONARY THROMBOSIS NOT RESULTING IN MYOCARDIAL INFARCTION: ICD-10-CM

## 2024-06-26 PROCEDURE — G2211 COMPLEX E/M VISIT ADD ON: CPT

## 2024-06-26 PROCEDURE — 93978 VASCULAR STUDY: CPT

## 2024-06-26 PROCEDURE — 99213 OFFICE O/P EST LOW 20 MIN: CPT

## 2024-06-26 PROCEDURE — 93306 TTE W/DOPPLER COMPLETE: CPT

## 2024-06-26 PROCEDURE — 99215 OFFICE O/P EST HI 40 MIN: CPT

## 2024-06-26 PROCEDURE — 93880 EXTRACRANIAL BILAT STUDY: CPT

## 2024-06-26 RX ORDER — PERPHENAZINE 2 MG
TABLET ORAL
Qty: 360 | Refills: 1 | Status: DISCONTINUED | COMMUNITY
End: 2024-06-26

## 2024-06-26 RX ORDER — CELECOXIB 200 MG/1
200 CAPSULE ORAL AS DIRECTED
Qty: 10 | Refills: 0 | Status: DISCONTINUED | COMMUNITY
Start: 2024-06-12 | End: 2024-06-26

## 2024-06-26 RX ORDER — CLOBETASOL PROPIONATE 0.5 MG/G
0.05 CREAM TOPICAL
Qty: 30 | Refills: 0 | Status: DISCONTINUED | COMMUNITY
Start: 2023-09-06 | End: 2024-06-26

## 2024-06-26 RX ORDER — DICLOFENAC SODIUM 1% 10 MG/G
1 GEL TOPICAL DAILY
Qty: 1 | Refills: 3 | Status: DISCONTINUED | COMMUNITY
Start: 2023-10-19 | End: 2024-06-26

## 2024-06-26 RX ORDER — MELOXICAM 15 MG/1
15 TABLET ORAL
Qty: 30 | Refills: 0 | Status: DISCONTINUED | COMMUNITY
Start: 2024-04-29 | End: 2024-06-26

## 2024-06-26 RX ORDER — CLOTRIMAZOLE AND BETAMETHASONE DIPROPIONATE 10; .5 MG/G; MG/G
1-0.05 CREAM TOPICAL
Qty: 45 | Refills: 0 | Status: DISCONTINUED | COMMUNITY
Start: 2023-09-19 | End: 2024-06-26

## 2024-06-26 NOTE — DISCUSSION/SUMMARY
[FreeTextEntry1] : Patient has medical history detailed above and active medical issues including:  - CAD, abnormal MPI stress test moderate anterior ischemia, CAROL LAD 1219/19 on asa, crestor, toprol. Nonbstructive catheterization Aug 2020. Nonobstructive coronary CTA July 2023.  - Hypertension average resting home BPs at guideline goal on Toprol, Lasix  - Dyslipidemia on Crestor well tolerated.  - History of ocular stroke with left eye blindness in 2014.  - Anxiety and depression after son passed away in 2016,  passed away 2018 lung cancer.  - Chronic back pain, sciatica, and knee pain limited ambulation.  - Tobacco addiction, quit smoking smoking again Nov 2023. Continued occasional cigarette smoking. Smoking cessation discussed at length.  Advised patient to follow active lifestyle with regular cardiovascular exercise. Patient educated on lifestyle and diet modification with low sodium low fat diet and avoidance of excessive alcohol. Patient is aware to call with any symptoms or concerns.  Patient will be seen in cardiology follow-up 6 months with echocardiogram and Doppler studies. Current cardiac medications remain unchanged and renewals are up to date. Repeat labs will be ordered with PMD.  [place] will follow up with Dr. Kael Garcia for primary care.  Total time spent 45 minutes, reviewing of test results, chart information, patient discussion, physical exam and completion of chart documentation.

## 2024-06-26 NOTE — REASON FOR VISIT
[Other: ____] : [unfilled] [Follow-Up - Clinic] : a clinic follow-up of [FreeTextEntry1] : Charlotte has a history of hypertension, dyslipidemia, tobacco, moderate obesity, ocular stroke, sciatica, knee pain, limited ambulation, arthritis, depression, death of her son in 2016 and her  June 2018 with lung cancer, abnormal MPI stress test moderate anterior ischemia, CAROL LAD 1219/19.   Cardiovascular review of systems is negative for exertional chest pain, dyspnea, palpitations, or syncope.  No PND, orthopnea, or leg edema.  No bleeding or black stool.  The patient has dizziness consistent with vertigo.   No exercise routine.  Patient is walking 15 minutes on occasion.    Echocardiogram June 2023 LVEF 61%, mild TR, normal RVSP.  Carotid and abdominal ultrasound, mild nonobstructive plaque, normal abdominal aortic size.   Coronary CTA July 2023 mild nonobstructive plaque mid to distal LAD, RCA, LCx without high-grade stenosis  Echocardiogram May 2023, LVEF 55-60%, mild LVH, trace MR and TR.  Echocardiogram June 2022 LVEF 55%, mild MR, normal RVSP.  Carotid and abdominal ultrasound June 2022, mild nonobstructive plaque, normal abdominal aortic size.  Echocardiogram June 2021, LVEF 60%, concentric LVH mild MR, normal RVSP  Lexascan Myoview stress test November 2019 LVEF 57%, moderate anterior ischemia seen on images directly reviewed by me, nondiagnostic EKG response with exaggeration of the baseline changes, no chest pain, baseline sinus rhythm NSST  Echocardiogram April 2019, LVEF 60%, mild diastolic dysfunction, trace MR TR, normal RVSP  Carotid and abdominal ultrasound April 2019, mild nonobstructive plaque, normal abdominal aortic size  2-D echo October 2017 LVEF 60-65%, mild diastolic dysfunction, mild MR, normal PASP.   Carotid and abdominal ultrasound October 2017, 2.6cm  Lexascan Myoview stress test, November 2017, LVEF 57%, mild midanterior ischemic defect, nonischemic EKG response, no anginal symptoms, baseline sinus rhythm NSST  EKG, October 11, 2017, sinus rhythm with inferolateral ST-T wave abnormalities consistent with ischemia.    [FreeTextEntry2] : MILLER and fatigue, abnormal MPI stress test moderate anterior ischemia, CAROL LAD 1219/19

## 2024-12-11 ENCOUNTER — APPOINTMENT (OUTPATIENT)
Dept: CARDIOLOGY | Facility: CLINIC | Age: 74
End: 2024-12-11
Payer: MEDICARE

## 2024-12-11 ENCOUNTER — NON-APPOINTMENT (OUTPATIENT)
Age: 74
End: 2024-12-11

## 2024-12-11 VITALS
BODY MASS INDEX: 31.08 KG/M2 | DIASTOLIC BLOOD PRESSURE: 82 MMHG | SYSTOLIC BLOOD PRESSURE: 138 MMHG | WEIGHT: 198 LBS | OXYGEN SATURATION: 95 % | HEIGHT: 67 IN | HEART RATE: 73 BPM

## 2024-12-11 DIAGNOSIS — E78.5 HYPERLIPIDEMIA, UNSPECIFIED: ICD-10-CM

## 2024-12-11 DIAGNOSIS — I25.10 ATHEROSCLEROTIC HEART DISEASE OF NATIVE CORONARY ARTERY W/OUT ANGINA PECTORIS: ICD-10-CM

## 2024-12-11 DIAGNOSIS — I24.0 ACUTE CORONARY THROMBOSIS NOT RESULTING IN MYOCARDIAL INFARCTION: ICD-10-CM

## 2024-12-11 DIAGNOSIS — I10 ESSENTIAL (PRIMARY) HYPERTENSION: ICD-10-CM

## 2024-12-11 DIAGNOSIS — Z95.5 PRESENCE OF CORONARY ANGIOPLASTY IMPLANT AND GRAFT: ICD-10-CM

## 2024-12-11 DIAGNOSIS — R07.9 CHEST PAIN, UNSPECIFIED: ICD-10-CM

## 2024-12-11 DIAGNOSIS — R94.39 ABNORMAL RESULT OF OTHER CARDIOVASCULAR FUNCTION STUDY: ICD-10-CM

## 2024-12-11 DIAGNOSIS — R53.83 OTHER FATIGUE: ICD-10-CM

## 2024-12-11 DIAGNOSIS — R06.09 OTHER FORMS OF DYSPNEA: ICD-10-CM

## 2024-12-11 PROCEDURE — 99215 OFFICE O/P EST HI 40 MIN: CPT

## 2024-12-11 PROCEDURE — G2211 COMPLEX E/M VISIT ADD ON: CPT

## 2025-02-27 ENCOUNTER — APPOINTMENT (OUTPATIENT)
Dept: OPHTHALMOLOGY | Facility: CLINIC | Age: 75
End: 2025-02-27
Payer: MEDICARE

## 2025-02-27 ENCOUNTER — NON-APPOINTMENT (OUTPATIENT)
Age: 75
End: 2025-02-27

## 2025-02-27 PROCEDURE — 92004 COMPRE OPH EXAM NEW PT 1/>: CPT

## 2025-02-27 PROCEDURE — 92134 CPTRZ OPH DX IMG PST SGM RTA: CPT

## 2025-02-27 PROCEDURE — 92202 OPSCPY EXTND ON/MAC DRAW: CPT

## 2025-03-28 ENCOUNTER — NON-APPOINTMENT (OUTPATIENT)
Age: 75
End: 2025-03-28

## 2025-03-28 ENCOUNTER — APPOINTMENT (OUTPATIENT)
Dept: OPHTHALMOLOGY | Facility: CLINIC | Age: 75
End: 2025-03-28
Payer: MEDICARE

## 2025-03-28 PROCEDURE — 92083 EXTENDED VISUAL FIELD XM: CPT

## 2025-04-03 ENCOUNTER — NON-APPOINTMENT (OUTPATIENT)
Age: 75
End: 2025-04-03

## 2025-04-03 ENCOUNTER — APPOINTMENT (OUTPATIENT)
Dept: OPHTHALMOLOGY | Facility: CLINIC | Age: 75
End: 2025-04-03
Payer: MEDICARE

## 2025-04-03 PROCEDURE — 92012 INTRM OPH EXAM EST PATIENT: CPT

## 2025-05-22 DIAGNOSIS — R06.09 OTHER FORMS OF DYSPNEA: ICD-10-CM

## 2025-05-22 DIAGNOSIS — I25.10 ATHEROSCLEROTIC HEART DISEASE OF NATIVE CORONARY ARTERY W/OUT ANGINA PECTORIS: ICD-10-CM

## 2025-05-22 DIAGNOSIS — E78.5 HYPERLIPIDEMIA, UNSPECIFIED: ICD-10-CM

## 2025-05-22 DIAGNOSIS — R07.9 CHEST PAIN, UNSPECIFIED: ICD-10-CM

## 2025-06-23 ENCOUNTER — APPOINTMENT (OUTPATIENT)
Dept: CARDIOLOGY | Facility: CLINIC | Age: 75
End: 2025-06-23
Payer: MEDICARE

## 2025-06-23 VITALS
DIASTOLIC BLOOD PRESSURE: 76 MMHG | HEIGHT: 67 IN | OXYGEN SATURATION: 98 % | WEIGHT: 200 LBS | HEART RATE: 69 BPM | BODY MASS INDEX: 31.39 KG/M2 | SYSTOLIC BLOOD PRESSURE: 132 MMHG

## 2025-06-23 LAB
APO LP(A) SERPL-MCNC: 139
HBA1C MFR BLD HPLC: 6.1

## 2025-06-23 PROCEDURE — 99215 OFFICE O/P EST HI 40 MIN: CPT

## 2025-06-23 PROCEDURE — 93306 TTE W/DOPPLER COMPLETE: CPT
